# Patient Record
Sex: FEMALE | Race: WHITE | Employment: FULL TIME | URBAN - METROPOLITAN AREA
[De-identification: names, ages, dates, MRNs, and addresses within clinical notes are randomized per-mention and may not be internally consistent; named-entity substitution may affect disease eponyms.]

---

## 2017-01-12 ENCOUNTER — OFFICE VISIT (OUTPATIENT)
Dept: INTERNAL MEDICINE CLINIC | Age: 42
End: 2017-01-12

## 2017-01-12 VITALS
DIASTOLIC BLOOD PRESSURE: 70 MMHG | HEART RATE: 77 BPM | RESPIRATION RATE: 16 BRPM | OXYGEN SATURATION: 99 % | HEIGHT: 63 IN | SYSTOLIC BLOOD PRESSURE: 130 MMHG | TEMPERATURE: 98.1 F | BODY MASS INDEX: 40.75 KG/M2 | WEIGHT: 230 LBS

## 2017-01-12 DIAGNOSIS — E55.9 HYPOVITAMINOSIS D: ICD-10-CM

## 2017-01-12 DIAGNOSIS — G47.30 MILD SLEEP APNEA: ICD-10-CM

## 2017-01-12 DIAGNOSIS — E83.52 SERUM CALCIUM ELEVATED: ICD-10-CM

## 2017-01-12 DIAGNOSIS — R53.83 FATIGUE, UNSPECIFIED TYPE: ICD-10-CM

## 2017-01-12 DIAGNOSIS — F41.1 GAD (GENERALIZED ANXIETY DISORDER): Primary | ICD-10-CM

## 2017-01-12 DIAGNOSIS — E78.00 HYPERCHOLESTEREMIA: ICD-10-CM

## 2017-01-12 DIAGNOSIS — G43.909 MIGRAINE WITHOUT STATUS MIGRAINOSUS, NOT INTRACTABLE, UNSPECIFIED MIGRAINE TYPE: ICD-10-CM

## 2017-01-12 DIAGNOSIS — E66.01 MORBID OBESITY DUE TO EXCESS CALORIES (HCC): ICD-10-CM

## 2017-01-12 RX ORDER — TOPIRAMATE 50 MG/1
50 TABLET, FILM COATED ORAL DAILY
COMMUNITY
End: 2017-03-07

## 2017-01-12 NOTE — PROGRESS NOTES
HPI:  Ale is a 39y.o. year old female who returns to clinic today for routine follow up appointment to discuss the issues below:    Here for follow up of anxiety and sleep. C/o fatigue, wakes up feeling tired after a few hours feels more awake. Takes all her meds at night. Has been on Lexapro for 15 years. Trazodone added 2015. Sleep study in 2011 showed borderline results and cpap didn't help her migraines. Hasn't gained weight since the study. Walks for exercise 30 minutes 3-4 days per week. Diet is higher in calories and sugar than ideal.   Notes in times of stress her weight goes up and she lacks motivation to exercise. Her mood is currently stable . She f/u with a PA at [facility] for her migraines, have been well controlled. Opted to wean off of Topamax. Current dose is 50 mg daily with plan to taper off soon. Reviewed labs - historically has had mild calcium elevation for the past few years. She hasn't taken her MVI for a few weeks. Is adherent currently to her D.  D was 22 last July. She takes iron prn when she feels tired around her cycle. Prior to Admission medications    Medication Sig Start Date End Date Taking? Authorizing Provider   topiramate (TOPAMAX) 50 mg tablet Take 50 mg by mouth daily. Yes Historical Provider   traZODone (DESYREL) 50 mg tablet TAKE 1 TABLET BY MOUTH NIGHTLY. 11/25/16  Yes Sharla Greer MD   escitalopram oxalate (LEXAPRO) 20 mg tablet TAKE 1 TABLET BY MOUTH EVERY DAY 7/17/16  Yes Sharla Greer MD   multivitamin (ONE A DAY) tablet Take 1 Tab by mouth daily. Historical Provider   b complex vitamins tablet Take 1 Tab by mouth daily.     Historical Provider   ferrous sulfate 325 mg (65 mg iron) tablet  3/25/16   Historical Provider   RELPAX 40 mg tablet  4/28/16   Historical Provider          Allergies   Allergen Reactions    Pcn [Penicillins] Rash           Review of Systems   Constitutional: Negative for chills, fever and malaise/fatigue. HENT: Negative for congestion. Respiratory: Negative for cough, shortness of breath and wheezing. Cardiovascular: Negative for chest pain, palpitations and leg swelling. Gastrointestinal: Negative for abdominal pain, blood in stool and heartburn. Musculoskeletal: Negative for falls, joint pain and myalgias. Neurological: Negative for dizziness and headaches. Physical Exam   Constitutional: She appears well-nourished. Obese   Neck: Carotid bruit is not present. No thyroid mass and no thyromegaly present. Cardiovascular: Normal rate, regular rhythm and normal heart sounds. No murmur heard. Pulses:       Carotid pulses are 2+ on the right side, and 2+ on the left side. Pulmonary/Chest: Effort normal and breath sounds normal.   Abdominal: Soft. Bowel sounds are normal. There is no hepatosplenomegaly. There is no tenderness. Musculoskeletal: She exhibits no edema. Visit Vitals    /70 (BP 1 Location: Left arm, BP Patient Position: Sitting)    Pulse 77    Temp 98.1 °F (36.7 °C) (Oral)    Resp 16    Ht 5' 3\" (1.6 m)    Wt 230 lb (104.3 kg)    LMP 01/04/2017 (Exact Date)    SpO2 99%    BMI 40.74 kg/m2         Assessment & Plan:  [facility] was seen today for anxiety and vitamin d deficiency. Diagnoses and all orders for this visit:    Fatigue, unspecified type  Likely multifactorial - obesity, poor diet, mild sleep apnea and medication side effect. Will start by reducing her Trazodone to 1/2 tab at night, if she does well may stop altogether. Encouraged weight reduction and exercise. Check labs/ iron due to h/o iron deficiency. Consider repeat sleep study if no improvement.    -     METABOLIC PANEL, COMPREHENSIVE  -     CBC W/O DIFF  -     FERRITIN    MAGGY (generalized anxiety disorder)  I evaluated and recommended to continue current doses of Lexapro as mood is stable. Plan to taper down Trazodone.     Mild sleep apnea  Historically - may need repeat study. Migraine without status migrainosus, not intractable, unspecified migraine type  Following with neurology - stable. Tapering off Topamax. Morbid obesity due to excess calories ([address]  Spent time discussing caloric intake and portion control. Encouraged high intensity exercise as opposed to few days per week walking. Hypovitaminosis D  -     VITAMIN D, 25 HYDROXY    Serum calcium elevated  Recheck labs this week  -     VITAMIN D, 25 HYDROXY  -     PTH INTACT  -     TSH REFLEX TO T4  -     METABOLIC PANEL, COMPREHENSIVE    Hypercholesteremia  Encouraged weight reduction. Will recheck fasting lipids.    -     METABOLIC PANEL, COMPREHENSIVE  -     LIPID PANEL       she opted not to get the flu vaccine today as she tested positive for flu a few weeks ago        Follow-up Disposition:  Return in about 6 months (around 7/12/2017) for follow up [facility] new provider for anxiety and weight. Advised her to call back or return to office if symptoms worsen/change/persist.  Discussed expected course/resolution/complications of diagnosis in detail with patient. Medication risks/benefits/costs/interactions/alternatives discussed with patient. She was given an after visit summary which includes diagnoses, current medications, & vitals. She expressed understanding with the diagnosis and plan.

## 2017-01-12 NOTE — MR AVS SNAPSHOT
Visit Information Date & Time Provider Department Dept. Phone Encounter #  
 1/12/2017  1:40 PM MD Kevin [facility] [phone] Follow-up Instructions Return in about 6 months (around 7/12/2017) for follow up wi new provider for anxiety and weight. Upcoming Health Maintenance Date Due  
 PAP AKA CERVICAL CYTOLOGY 11/13/2015 DTaP/Tdap/Td series (2 - Td) 4/28/2024 Allergies as of 1/12/2017  Review Complete On: 1/12/2017 By: [id number], MD  
  
 Severity Noted Reaction Type Reactions Pcn [Penicillins]  05/13/2011    Rash Current Immunizations  Reviewed on 1/12/2017 Name Date Influenza Vaccine 12/1/2014, 10/1/2013 Influenza Vaccine Split 11/13/2012 Tdap 4/28/2014 Reviewed by [address], MD on 1/12/2017 at  2:32 PM  
You Were Diagnosed With   
  
 Codes Comments MAGGY (generalized anxiety disorder)    -  Primary ICD-10-CM: F41.1 ICD-9-CM: 300.02 Mild sleep apnea     ICD-10-CM: G47.30 ICD-9-CM: 780.57 Migraine without status migrainosus, not intractable, unspecified migraine type     ICD-10-CM: G43.909 ICD-9-CM: 346.90 Morbid obesity due to excess calories (HCC)     ICD-10-CM: E66.01 
ICD-9-CM: 278.01 Hypovitaminosis D     ICD-10-CM: E55.9 ICD-9-CM: 268.9 Serum calcium elevated     ICD-10-CM: E83.52 
ICD-9-CM: 275.42 Fatigue, unspecified type     ICD-10-CM: R53.83 ICD-9-CM: 780.79 Hypercholesteremia     ICD-10-CM: E78.00 ICD-9-CM: 272.0 Vitals BP Pulse Temp Resp Height(growth percentile) Weight(growth percentile) 130/70 (BP 1 Location: Left arm, BP Patient Position: Sitting) 77 98.1 °F (36.7 °C) (Oral) 16 5' 3\" (1.6 m) 230 lb (104.3 kg) LMP SpO2 BMI OB Status Smoking Status 01/04/2017 (Exact Date) 99% 40.74 kg/m2 Having regular periods Never Smoker Vitals History BMI and BSA Data Body Mass Index Body Surface Area 40.74 kg/m 2 2.15 m 2 Preferred Pharmacy Pharmacy Name Phone [id number] [address] [phone] Your Updated Medication List  
  
   
This list is accurate as of: 1/12/17  2:35 PM.  Always use your most recent med list.  
  
  
  
  
 b complex vitamins tablet Take 1 Tab by mouth daily. escitalopram oxalate 20 mg tablet Commonly known as:  Cliff Yajaira TAKE 1 TABLET BY MOUTH EVERY DAY  
  
 ferrous sulfate 325 mg (65 mg iron) tablet  
  
 multivitamin tablet Commonly known as:  ONE A DAY Take 1 Tab by mouth daily. RELPAX 40 mg tablet Generic drug:  eletriptan  
  
 TOPAMAX 50 mg tablet Generic drug:  topiramate Take 50 mg by mouth daily. traZODone 50 mg tablet Commonly known as:  DESYREL  
TAKE 1 TABLET BY MOUTH NIGHTLY. We Performed the Following CBC W/O DIFF [83109 CPT(R)] FERRITIN [86180 CPT(R)] LIPID PANEL [30754 CPT(R)] METABOLIC PANEL, COMPREHENSIVE [07331 CPT(R)] PTH INTACT [14755 CPT(R)] TSH REFLEX TO T4 [[id number] Custom] VITAMIN D, 25 HYDROXY C356356 CPT(R)] Follow-up Instructions Return in about 6 months (around 7/12/2017) for follow up wiht new provider for anxiety and weight. Introducing [facility]! Dear Jarrett Reyes: 
Thank you for requesting a [employer] account. Our records indicate that you have previously registered for a [employer] account but its currently inactive. Please call our [employer] support line at [phone]. Additional Information If you have questions, please visit the Frequently Asked Questions section of the [employer] website at [email or web address] Remember, [employer] is NOT to be used for urgent needs. For medical emergencies, dial 911. Now available from your iPhone and Android! Please provide this summary of care documentation to your next provider. Your primary care clinician is listed as Bel Forman. If you have any questions after today's visit, please call [phone].

## 2017-01-12 NOTE — PROGRESS NOTES
1. Have you been to the ER, urgent care clinic since your last visit? Hospitalized since your last visit? Yes When: patient [address] 1/6/17 DX positive Flu     2. Have you seen or consulted any other health care providers outside of the [address] [place] [address] since your last visit? Include any pap smears or colon screening.  No  Chief Complaint   Patient presents with    Anxiety    Vitamin D Deficiency

## 2017-01-14 LAB
25(OH)D3+25(OH)D2 SERPL-MCNC: 18 NG/ML (ref 30–100)
ALBUMIN SERPL-MCNC: 4.2 G/DL (ref 3.5–5.5)
ALBUMIN/GLOB SERPL: 1.8 {RATIO} (ref 1.1–2.5)
ALP SERPL-CCNC: 111 IU/L (ref 39–117)
ALT SERPL-CCNC: 23 IU/L (ref 0–32)
AST SERPL-CCNC: 18 IU/L (ref 0–40)
BILIRUB SERPL-MCNC: 0.4 MG/DL (ref 0–1.2)
BUN SERPL-MCNC: 13 MG/DL (ref 6–24)
BUN/CREAT SERPL: 14 (ref 9–23)
CALCIUM SERPL-MCNC: 10.7 MG/DL (ref 8.7–10.2)
CHLORIDE SERPL-SCNC: 104 MMOL/L (ref 96–106)
CHOLEST SERPL-MCNC: 256 MG/DL (ref 100–199)
CO2 SERPL-SCNC: 19 MMOL/L (ref 18–29)
CREAT SERPL-MCNC: 0.96 MG/DL (ref 0.57–1)
ERYTHROCYTE [DISTWIDTH] IN BLOOD BY AUTOMATED COUNT: 14.2 % (ref 12.3–15.4)
FERRITIN SERPL-MCNC: 15 NG/ML (ref 15–150)
GLOBULIN SER CALC-MCNC: 2.3 G/DL (ref 1.5–4.5)
GLUCOSE SERPL-MCNC: 87 MG/DL (ref 65–99)
HCT VFR BLD AUTO: 35.8 % (ref 34–46.6)
HDLC SERPL-MCNC: 52 MG/DL
HGB BLD-MCNC: 11.5 G/DL (ref 11.1–15.9)
INTERPRETATION, 910389: NORMAL
LDLC SERPL CALC-MCNC: 174 MG/DL (ref 0–99)
MCH RBC QN AUTO: 25.9 PG (ref 26.6–33)
MCHC RBC AUTO-ENTMCNC: 32.1 G/DL (ref 31.5–35.7)
MCV RBC AUTO: 81 FL (ref 79–97)
PLATELET # BLD AUTO: 300 X10E3/UL (ref 150–379)
POTASSIUM SERPL-SCNC: 4.7 MMOL/L (ref 3.5–5.2)
PROT SERPL-MCNC: 6.5 G/DL (ref 6–8.5)
PTH-INTACT SERPL-MCNC: 82 PG/ML (ref 15–65)
RBC # BLD AUTO: 4.44 X10E6/UL (ref 3.77–5.28)
SODIUM SERPL-SCNC: 139 MMOL/L (ref 134–144)
TRIGL SERPL-MCNC: 151 MG/DL (ref 0–149)
TSH SERPL DL<=0.005 MIU/L-ACNC: 1.84 UIU/ML (ref 0.45–4.5)
VLDLC SERPL CALC-MCNC: 30 MG/DL (ref 5–40)
WBC # BLD AUTO: 6.7 X10E3/UL (ref 3.4–10.8)

## 2017-01-16 ENCOUNTER — TELEPHONE (OUTPATIENT)
Dept: INTERNAL MEDICINE CLINIC | Age: 42
End: 2017-01-16

## 2017-01-16 DIAGNOSIS — E55.9 VITAMIN D DEFICIENCY: Primary | ICD-10-CM

## 2017-01-16 NOTE — TELEPHONE ENCOUNTER
Patient called stating that she was returning a call from [facility] about her lab results. Please call her back at [phone]

## 2017-01-17 RX ORDER — ERGOCALCIFEROL 1.25 MG/1
50000 CAPSULE ORAL
Qty: 12 CAP | Refills: 0 | Status: SHIPPED | OUTPATIENT
Start: 2017-01-17 | End: 2018-01-24

## 2017-01-17 NOTE — TELEPHONE ENCOUNTER
Spoke to pt - labs c/w hyperparathyroidism, but she is also D deficient.    Her cholesterol is also elevated    Plan:  Start prescription strength D weekly for 12 weeks  Schedule to see endocrine - several providers names given  Call me with name of endocrinologist she will see so that records may be faxed  Lifestyle changes to work on cholesterol

## 2017-01-18 NOTE — TELEPHONE ENCOUNTER
Last office visit - 01.12.17  Next office visit -   Last Refill - 07.17.16     Requested Prescriptions     Pending Prescriptions Disp Refills    escitalopram oxalate (LEXAPRO) 20 mg tablet 30 Tab 5

## 2017-01-18 NOTE — TELEPHONE ENCOUNTER
Patient has been travelling for work and has left her lexapro in a hotel in another city. She wanted to know if a script could be sent to her local pharmacy. PAtient's number is [phone]

## 2017-01-19 RX ORDER — ESCITALOPRAM OXALATE 20 MG/1
TABLET ORAL
Qty: 30 TAB | Refills: 11 | Status: SHIPPED | OUTPATIENT
Start: 2017-01-19 | End: 2017-02-01 | Stop reason: SDUPTHER

## 2017-01-19 NOTE — TELEPHONE ENCOUNTER
Pt is calling to check the status on refill and wanted to inform Dr. Alecia Ibarra that she has scheduled an appointment with an Endocrinologist Dr. Sharif Moscoso and Griselda Lanier for 3/7/17

## 2017-01-27 ENCOUNTER — TELEPHONE (OUTPATIENT)
Dept: INTERNAL MEDICINE CLINIC | Age: 42
End: 2017-01-27

## 2017-01-27 DIAGNOSIS — M54.2 NECK PAIN: Primary | ICD-10-CM

## 2017-01-27 NOTE — TELEPHONE ENCOUNTER
Patient [facility]  She doesn't require an insurance referral. Please route back to me once order is put in so that I may call patient to

## 2017-01-27 NOTE — TELEPHONE ENCOUNTER
Pt call today stating that her massage therapist told her that she need physical therapy for her migraine you can contact pt at [phone]

## 2017-01-29 RX ORDER — TRAZODONE HYDROCHLORIDE 50 MG/1
TABLET ORAL
Qty: 30 TAB | Refills: 0 | Status: SHIPPED | OUTPATIENT
Start: 2017-01-29 | End: 2018-01-24

## 2017-01-31 NOTE — PROGRESS NOTES
See telephone note:    Spoke to pt - labs c/w hyperparathyroidism, but she is also D deficient.    Her cholesterol is also elevated     Plan:  Start prescription strength D weekly for 12 weeks  Schedule to see endocrine - several providers names given  Call me with name of endocrinologist she will see so that records may be faxed  Lifestyle changes to work on cholesterol

## 2017-02-01 NOTE — TELEPHONE ENCOUNTER
I don't typically refer migraine patients to PT, so I called pt to find out further details. Left VM for her to return call with further details.

## 2017-02-02 NOTE — TELEPHONE ENCOUNTER
Pt states that she ahs neck pain and her migraines seem to be related to neck pain. Pt states that her masseuse states that she has migraines related to neck pain and that she had PT to strength her neck muscles and pt states that she would like to try it.

## 2017-02-03 NOTE — TELEPHONE ENCOUNTER
Attempted to contact patient without success.  Left voicemail informing patient that referral requested is ready for  and our office hours

## 2017-02-14 ENCOUNTER — HOSPITAL ENCOUNTER (OUTPATIENT)
Dept: PHYSICAL THERAPY | Age: 42
Discharge: HOME | End: 2017-02-14
Payer: COMMERCIAL

## 2017-02-14 PROCEDURE — 97161 PT EVAL LOW COMPLEX 20 MIN: CPT | Performed by: PHYSICAL THERAPIST

## 2017-02-14 PROCEDURE — 97530 THERAPEUTIC ACTIVITIES: CPT | Performed by: PHYSICAL THERAPIST

## 2017-02-14 PROCEDURE — 97110 THERAPEUTIC EXERCISES: CPT | Performed by: PHYSICAL THERAPIST

## 2017-02-14 NOTE — PROGRESS NOTES
[facility]  [address] [facility], [address]  Phone: [phone]  Fax: [phone]    PT INITIAL EVALUATION NOTE - [facility] 2-15    Patient Name: Jarred Abrams  Date:2017  : 1975  [x]  Patient  Verified  Payor: Carolyn Jose L / Plan: Treinta Y Fidel [id number] PPO / Product Type: PPO /    In time:835  Out time:940  Total Treatment Time (min): 55  Total Timed Codes (min): 25  1:1 Treatment Time ( only): n/a   Visit #: 1     Treatment Area: Cervicalgia [M54.2]    Subjective: Any medication changes, allergies to medications, adverse drug reactions, diagnosis change, or new procedure performed?: [] No    [x] Yes (see summary    Date of onset/injury: 10 years ago regarding migraines/neck pain and maybe even longer for neck pain. Pt notes treatment for migraines. Treatment for migraines include a neurologist, topomax (didn't really work), relpax, lifestyle changes - walking 30 ' a day, consistent bed/wake times and meals. Frequency now:  7-8x/month (pt noting using full prescription of relpax each month). She misses some work time due to migraines. Neck pain is posterior cervical muscles, right side mostly \"95% of the time. \"     Migraines tend to be on the right side, more in afternoon and evening - typically at work but could be from working on project at home, busy in summer outside, sometimes wakes up from them, right posterior neck, radiate to head and to right forehead. Sleep - she does not use a pillow - hurts neck too much, sleeps on her back. Pain:   6/10 max 0/10 min 0 \"stiffness\"/10 now       Aggravated by: ignoring it - still working at the computer, busy working in the yard, bending down to  something heavy. Stress at work. Eased by: stretch, heat, rest, aleave, relpax. Location and description of symptoms: \"feels like a weak spot\" \"stiffness\"      Diagnostic Tests: None.       Social/Recreation/Work: Sitting at her computer a lot, working at [employer], sitting and writing. Pt sits for 9 hours/day. Pt sits for 4 hour stretches at  A time. Prior level of function: Pt notes worse in past 3-4 months \"I don't really know why\"  Pt notes did have flu in December, may have been sleeping funny. Prior to 3-4 months ago had 1-2 migraines a month. Patient goal(s): \"I want to strength neck with goal of reducing number and severity of migraines\"  \"1-2x/month migraines\"      PMH: Significant for migraines, mild depression    Headaches: Do you have headaches? [x] Yes   [] No  Migraines - usually on the right side of neck, radiate up head and to right forehead    Objective:      Observation/posture: Sitting at computer, head is turned to the left, computer is set up in the corner, L-shaped desk, typing from a document, flat on the table. Pt has adjustable chair, monitor is adjustable and has monitor straight ahead, ergonomic keyboard. Active Movements:   AROM Degrees Comments:pain, area   Forward flexion 48 deg Stretch on the right, feels good  *although, pt notes stretching sometimes makes pain worse   Extension 49 deg No change   Rotation right 50 deg Stretch left   Rotation left 63 deg Tight right   SB right 24 deg Pressure right, stretch left   SB left 20 deg Stretch right         Strength: cervical extension: 5/5, no change in pain. Neurosensory:  Sensory examination reveals NT, no c/o of numbness/tingling/burning. Palpation: hypertonicity and increased pain right upper trap., R SCM at mastoid process, right scalenes, posterior cervical muscles at C6 level \"knot\", right sub occipitals, pt notes pain up back of her head with palpation to S.O. Muscles. Special Tests:  Cervical:          Distraction:  Feels relief/stretching. Outcome Measure: Patient presents with a FOTO Score of  In chart.       10 min Therapeutic Exercise:  [] See flow sheet : CCF in seated position, scapular retractions in seated position. Supine CCF, supine sub occipital stretch (CCF position with rolled towel). Rationale: increase strength and improve coordination to improve the patients ability to work at computer    15 min Therapeutic Activity:  []  See flow sheet :  Long discussion of computer work station posture : use of rolled towel to show lumbar support, pt ed. To adjust arm rests to support her arm, use of document lugo placed directly next to monitor and vary which side it is on to minimize accumulated stress/strain on same tissues, avoid sitting > 30-60 ' at a time, avoid [facility] - move monitor closer if needed. Rationale: improve coordination  to improve the patients ability to work at computer. With   [] TE   [] TA   [] neuro   [] other: Patient Education: [x] Review HEP    [] Progressed/Changed HEP based on:   [] positioning   [] body mechanics   [] transfers   [] heat/ice application    [] other:        Pain Level (0-10 scale) post treatment: no c/o of pain.     Assessment:  [x] See POC  [] Other:    Plan:   [x] See POC    Kelly Garnett PT DPT, OCS 2/14/2017  8:33 AM

## 2017-02-14 NOTE — PROGRESS NOTES
[facility]  [address], [place], [address] [facility]  Phone: [phone]      Fax:  [phone] of Care/ Statement of Necessity for Physical Therapy [address]  2/14/2017   Odalis Kruger MD   Provider #                        Diagnosis: Cervicalgia [M54.2]  Onset Date:See eval.     Start of Care:2/14/2017  Prior Level of Function:See eval.  Comorbidities: See eval.    The Plan of Care and following information is based on the information from the initial evaluation. Assessment/ key information: Pt is a 40 yo female with chronic neck pain as well as migraines. Pt correlates her mostly right sided neck pain with migraines/head pain. Pt demonstrates very poor work station set up and sits for 4 hours at a time which is likely increasing neck pain and potentially triggering migraines. Pt with increased TTP right SCM, scalenes, upper trap., sub occipitals and posterior cervical musculature at C6 level. Pt notes she is frequently turning her head to the left to look at document on her desk while typing on computer. Pt is an excellent candidate for PT to address above deficits. Treatment Plan may include any combination of the following:  Therapeutic exercise, Therapeutic activities, Neuromuscular re-education, Physical agent/modality, Manual therapy and Patient education  Patient / Family readiness to learn indicated by: asking questions, trying to perform skills and interest  Persons(s) to be included in education:   patient (P)  Barriers to Learning/Limitations: None    Patient Goal (s): Located in evaluation. Rehabilitation Potential: good    Short Term Goals: To be accomplished in 2 weeks  1) Pt will report she is sitting 60 ' maximum at work versus 4 hours for decreased sustained strain to cervical musculature.   2) Pt will report she is using document lugo next to monitor and varying left to right to decrease sustained strain to cervical musculature. 3) Pt will report she has improved work station set up including use of lumbar support and moving monitor closer to her to reduce FHP to decrease sustained strain to cervical musculature    Long Term Goals: To be accomplished in 4-6 weeks   1) Pt indep. In final HEP  2) Pt will report decreased frequency of migraines from 7-8 x / month to baseline of 1-2x/month  3) Pt will not need to use full prescription of relpax each month per her report due to decreased severity/frequency of migraines  4) Pt will report she avoids sitting for > 30 ' at a time at computer work station  5) Pt will report at least 50% improvement in right sided neck pain since starting PT    Frequency / Duration: Patient to be seen 1-2 times per week for 4-6 weeks:  Patient/ Caregiver education and instruction: self care and exercises    Carie Davila, PT DPT, OCS 2/14/2017 8:33 AM    Please direct questions or concerns to [place] [address] and Sports Medicine: Phone: [phone] Fax: [phone]. THANK YOU!

## 2017-02-16 ENCOUNTER — HOSPITAL ENCOUNTER (OUTPATIENT)
Dept: PHYSICAL THERAPY | Age: 42
Discharge: HOME | End: 2017-02-16
Payer: COMMERCIAL

## 2017-02-16 PROCEDURE — 97110 THERAPEUTIC EXERCISES: CPT | Performed by: PHYSICAL THERAPY ASSISTANT

## 2017-02-16 PROCEDURE — 97140 MANUAL THERAPY 1/> REGIONS: CPT | Performed by: PHYSICAL THERAPY ASSISTANT

## 2017-02-16 NOTE — PROGRESS NOTES
PT DAILY TREATMENT NOTE 2-15    Patient Name: Jarred Abrams  Date:2017  : 1975  [x]  Patient  Verified  Payor: Carolyn Domingo / Plan: Angela Parra [id number] PPO / Product Type: PPO /    In time:8:35 AM  Out time: 9:25 AM  Total Treatment Time (min): 50  Visit #: 2     Treatment Area: Cervicalgia [M54.2]    SUBJECTIVE  Pain Level (0-10 scale): 0 \"Tightness on the Right\" States she has modified her workstation per therapist recommendations \"That has really helped. \"     Any medication changes, allergies to medications, adverse drug reactions, diagnosis change, or new procedure performed?: [x] No    [] Yes (see summary sheet for update)  Subjective functional status/changes:   [] No changes reported  See pedro    OBJECTIVE    Modality rationale: decrease pain and increase tissue extensibility to improve the patients ability to working on the computer, working in the yard, bending down to  something heavy   Min Type Additional Details    [] Estim: []Att   []Unatt        []TENS instruct                  []IFC  []Premod   []NMES                     []Other:  []w/US   []w/ice   []w/heat  Position:  Location:    []  Traction: [] Cervical       []Lumbar                       [] Prone          []Supine                       []Intermittent   []Continuous Lbs:  [] before manual  [] after manual  []w/heat    []  Ultrasound: []Continuous   [] Pulsed at:                            []1MHz   []3MHz Location:  W/cm2:    []  Paraffin         Location:  []w/heat   10 []  Ice     [x]  Heat prior to manual therapy  []  Ice massage Position:supine with LE's elevated  Location:    []  Laser  []  Other: Position:  Location:    []  Vasopneumatic Device Pressure:       [] lo [] med [] hi   Temperature:    [x] Skin assessment post-treatment:  [x]intact []redness- no adverse reaction    []redness  adverse reaction:     25 min Therapeutic Exercise:  [x] See flow sheet : added foam roll on wall \"w\", Tband shoulder row and extension with red band, SCM stretch   Rationale: increase ROM, increase strength and improve coordination to improve the patients ability to working on the computer, working in the yard, bending down to  something heavy    15 min Manual Therapy:  STM R UT, Jason Hall. Scapula, SCM, manual UT and Lev. Scap stretches   Rationale: decrease pain, increase ROM, increase tissue extensibility and decrease trigger points  to improve the patients ability to working on the computer, working in the yard, bending down to  something heavy            With   [x] TE   [] TA   [] neuro   [] other: Patient Education: [x] Review HEP    [x] Progressed/Changed HEP based on: TB shoulder row and extension, gave red band, SCM stretch  [x] positioning   [x] body mechanics   [] transfers   [] heat/ice application    [x] other: reviewed postural principles and ergonomic work station set up. Other Objective/Functional Measures: nt     Pain Level (0-10 scale) post treatment: 0/10 \"Not as tight. \"    ASSESSMENT/Changes in Function:   Patient is compliant with HEP and modified work station to improve ergonomics. Patient without reports of pain during new scapular strengthening exercises, minor cues to avoid UT over utilization with TB shoulder rows. Overall tolerated session well, gave tband and printout for updated HEP. .   Patient will continue to benefit from skilled PT services to modify and progress therapeutic interventions, address functional mobility deficits, address ROM deficits, address strength deficits, analyze and address soft tissue restrictions, analyze and modify body mechanics/ergonomics and instruct in home and community integration to attain remaining goals.      []  See Plan of Care  []  See progress note/recertification  []  See Discharge Summary         Progress towards goals / Updated goals:  nt    PLAN  []  Upgrade activities as tolerated     [x]  Continue plan of care  []  Update interventions per flow sheet       []  Discharge due to:_  []  Other:_      Maria Luisa Daly, SHRUTHI 2/16/2017  8:35 AM

## 2017-02-23 ENCOUNTER — HOSPITAL ENCOUNTER (OUTPATIENT)
Dept: PHYSICAL THERAPY | Age: 42
Discharge: HOME | End: 2017-02-23
Payer: COMMERCIAL

## 2017-02-23 PROCEDURE — 97140 MANUAL THERAPY 1/> REGIONS: CPT | Performed by: PHYSICAL THERAPIST

## 2017-02-23 PROCEDURE — 97110 THERAPEUTIC EXERCISES: CPT | Performed by: PHYSICAL THERAPIST

## 2017-02-23 NOTE — PROGRESS NOTES
PT DAILY TREATMENT NOTE 2-15    Patient Name: Oswaldo King  Date:2017  : 1975  [x]  Patient  Verified  Payor: BLUE CROSS / Plan: Angela Parra [id number] PPO / Product Type: PPO /    In time:300PM  Out time: 400  Total Treatment Time (min): 60  Timed codes: 50  Visit #: 3    Treatment Area: Cervicalgia [M54.2]    SUBJECTIVE  Pain Level (0-10 scale): 2/10, pt notes that she did have a migraine this morning, took medication but this was the first time she had migraine since starting PT     Any medication changes, allergies to medications, adverse drug reactions, diagnosis change, or new procedure performed?: [x] No    [] Yes (see summary sheet for update)  Subjective functional status/changes:   [] No changes reported  See pedro    OBJECTIVE    Modality rationale: decrease pain and increase tissue extensibility to improve the patients ability to working on the computer, working in the yard, bending down to  something heavy   Min Type Additional Details    [] Estim: []Att   []Unatt        []TENS instruct                  []IFC  []Premod   []NMES                     []Other:  []w/US   []w/ice   []w/heat  Position:  Location:    []  Traction: [] Cervical       []Lumbar                       [] Prone          []Supine                       []Intermittent   []Continuous Lbs:  [] before manual  [] after manual  []w/heat    []  Ultrasound: []Continuous   [] Pulsed at:                            []1MHz   []3MHz Location:  W/cm2:    []  Paraffin         Location:  []w/heat   10 []  Ice     [x]  Heat prior to manual therapy  []  Ice massage Position:supine with LE's elevated  Location:    []  Laser  []  Other: Position:  Location:    []  Vasopneumatic Device Pressure:       [] lo [] med [] hi   Temperature:    [x] Skin assessment post-treatment:  [x]intact []redness- no adverse reaction    []redness  adverse reaction:     35 min Therapeutic Exercise:  [x] See flow sheet : added prone over swiss ball CCF and lower cervical extension with arm lifts - increased time to cues posture. Also, seated on swiss ball with neutral spine, engaging abdominals and mini march. Corrected SCM stretch, SO stretch. Review of posture at work station, encouraged pt to return to [employer] where she is a member and start cardiovascular exercise 1x/week to counteract sitting, for stress relief as well as avoid sitting for > 30-60 ' at a time. Rationale: increase ROM, increase strength and improve coordination to improve the patients ability to working on the computer, working in the yard, bending down to  something heavy    15 min Manual Therapy:  STM R scalenes, R SO muscles / release, R SCM. Rationale: decrease pain, increase ROM, increase tissue extensibility and decrease trigger points  to improve the patients ability to working on the computer, working in the yard, bending down to  something heavy            With   [x] TE   [] TA   [] neuro   [] other: Patient Education: [x] Review HEP    [x] Progressed/Changed HEP based on: seated on swiss ball march and prone over swiss ball  [x] positioning   [x] body mechanics   [] transfers   [] heat/ice application    [x] other: reviewed postural principles and ergonomic work station set up. Other Objective/Functional Measures: nt     Pain Level (0-10 scale) post treatment: \"0.5\"    ASSESSMENT/Changes in Function:   Pt shows improvement, notes today was first migraine since starting PT 02/14. Also, pt is doing well with modifications at work station but still sitting 2 hours at a time. Encouraged pt to limit sitting to 30-60 minutes as well as increasing regular cardiovascular exercise to counteract sustained sitting postures. Pt noting she does have a gym membership but does not go at this time. Pt has met 2/3 STG's at this time.      Patient will continue to benefit from skilled PT services to modify and progress therapeutic interventions, address functional mobility deficits, address ROM deficits, address strength deficits, analyze and address soft tissue restrictions, analyze and modify body mechanics/ergonomics and instruct in home and community integration to attain remaining goals. []  See Plan of Care  []  See progress note/recertification  []  See Discharge Summary         Short Term Goals: To be accomplished in 2 weeks  1) Pt will report she is sitting 60 ' maximum at work versus 4 hours for decreased sustained strain to cervical musculature. Progressing - pt is sitting 2 hr at a time. 2) Pt will report she is using document lugo next to monitor and varying left to right to decrease sustained strain to cervical musculature. MET  3) Pt will report she has improved work station set up including use of lumbar support and moving monitor closer to her to reduce FHP to decrease sustained strain to cervical musculature MET      Long Term Goals: To be accomplished in 4-6 weeks   1) Pt indep.  In final HEP  2) Pt will report decreased frequency of migraines from 7-8 x / month to baseline of 1-2x/month  3) Pt will not need to use full prescription of relpax each month per her report due to decreased severity/frequency of migraines  4) Pt will report she avoids sitting for > 30 ' at a time at computer work station  5) Pt will report at least 50% improvement in right sided neck pain since starting PT    PLAN  []  Upgrade activities as tolerated     [x]  Continue plan of care  []  Update interventions per flow sheet       []  Discharge due to:_  [x]  Other: review updated HEP (wrote down on paper as printer was not working)_      Karen Ferrari, PT 2/23/2017  8:35 AM

## 2017-02-28 ENCOUNTER — APPOINTMENT (OUTPATIENT)
Dept: PHYSICAL THERAPY | Age: 42
End: 2017-02-28
Payer: COMMERCIAL

## 2017-03-03 ENCOUNTER — HOSPITAL ENCOUNTER (OUTPATIENT)
Dept: PHYSICAL THERAPY | Age: 42
Discharge: HOME | End: 2017-03-03
Payer: COMMERCIAL

## 2017-03-03 PROCEDURE — 97140 MANUAL THERAPY 1/> REGIONS: CPT | Performed by: PHYSICAL THERAPY ASSISTANT

## 2017-03-03 PROCEDURE — 97110 THERAPEUTIC EXERCISES: CPT | Performed by: PHYSICAL THERAPY ASSISTANT

## 2017-03-03 NOTE — PROGRESS NOTES
PT DAILY TREATMENT NOTE 2-15    Patient Name: Anjali Handley  Date:3/3/2017  : 1975  [x]  Patient  Verified  Payor: BLUE CROSS / Plan: Angela Parra [id number] PPO / Product Type: PPO /    In time:9:05 AM  Out time:9:55 AM  Total Treatment Time (min): 50  Timed codes: 40  Visit #: 4    Treatment Area: Cervicalgia [M54.2]    SUBJECTIVE  Pain Level (0-10 scale): 2/10, \"I had a couple migraines due to the weather fronts but I don't think there is anything I could do with that, I also had some neck pain associated with that. \" States a little neck pain today \"i think I slept funny on my side but it's not too bad. \"    Any medication changes, allergies to medications, adverse drug reactions, diagnosis change, or new procedure performed?: [x] No    [] Yes (see summary sheet for update)  Subjective functional status/changes:   [] No changes reported  See pedro    OBJECTIVE    Modality rationale: decrease pain and increase tissue extensibility to improve the patients ability to working on the computer, working in the yard, bending down to  something heavy   Min Type Additional Details    [] Estim: []Att   []Unatt        []TENS instruct                  []IFC  []Premod   []NMES                     []Other:  []w/US   []w/ice   []w/heat  Position:  Location:    []  Traction: [] Cervical       []Lumbar                       [] Prone          []Supine                       []Intermittent   []Continuous Lbs:  [] before manual  [] after manual  []w/heat    []  Ultrasound: []Continuous   [] Pulsed at:                            []1MHz   []3MHz Location:  W/cm2:    []  Paraffin         Location:  []w/heat   10 []  Ice     [x]  Heat prior to manual therapy  []  Ice massage Position:supine with LE's elevated  Location:    []  Laser  []  Other: Position:  Location:    []  Vasopneumatic Device Pressure:       [] lo [] med [] hi   Temperature:    [x] Skin assessment post-treatment:  [x]intact []redness- no adverse reaction    []redness  adverse reaction:     25 min Therapeutic Exercise:  [x] See flow sheet :     Rationale: increase ROM, increase strength and improve coordination to improve the patients ability to working on the computer, working in the yard, bending down to  something heavy    15 min Manual Therapy:  STM R scalenes, R SO muscles / release, R SCM. Rationale: decrease pain, increase ROM, increase tissue extensibility and decrease trigger points  to improve the patients ability to working on the computer, working in the yard, bending down to  something heavy            With   [x] TE   [] TA   [] neuro   [] other: Patient Education: [x] Review HEP    [x] Progressed/Changed HEP based on: seated on swiss ball march and prone over swiss ball  [x] positioning   [x] body mechanics   [] transfers   [] heat/ice application    [x] other:       Other Objective/Functional Measures: nt     Pain Level (0-10 scale) post treatment: \"0.5\" \"Much looser. \"    ASSESSMENT/Changes in Function:   Decreased cues for technique during SCM stretch. Challenged to maintain chin tuck during prone over therabald alt. UE flexion. Overall improved postural awareness since start of PT. Patient will continue to benefit from skilled PT services to modify and progress therapeutic interventions, address functional mobility deficits, address ROM deficits, address strength deficits, analyze and address soft tissue restrictions, analyze and modify body mechanics/ergonomics and instruct in home and community integration to attain remaining goals. []  See Plan of Care  []  See progress note/recertification  []  See Discharge Summary         Short Term Goals: To be accomplished in 2 weeks  1) Pt will report she is sitting 60 ' maximum at work versus 4 hours for decreased sustained strain to cervical musculature. Progressing - pt is sitting 2 hr at a time.   2) Pt will report she is using document lugo next to monitor and varying left to right to decrease sustained strain to cervical musculature. MET  3) Pt will report she has improved work station set up including use of lumbar support and moving monitor closer to her to reduce FHP to decrease sustained strain to cervical musculature MET      Long Term Goals: To be accomplished in 4-6 weeks   1) Pt indep.  In final HEP  2) Pt will report decreased frequency of migraines from 7-8 x / month to baseline of 1-2x/month  3) Pt will not need to use full prescription of relpax each month per her report due to decreased severity/frequency of migraines  4) Pt will report she avoids sitting for > 30 ' at a time at computer work station  5) Pt will report at least 50% improvement in right sided neck pain since starting PT     PLAN  []  Upgrade activities as tolerated     [x]  Continue plan of care  []  Update interventions per flow sheet       []  Discharge due to:_  [x]  Other:    Annabella Hernandez, PTA 3/3/2017  9:05 AM

## 2017-03-07 ENCOUNTER — HOSPITAL ENCOUNTER (OUTPATIENT)
Dept: PHYSICAL THERAPY | Age: 42
Discharge: HOME | End: 2017-03-07
Payer: COMMERCIAL

## 2017-03-07 ENCOUNTER — OFFICE VISIT (OUTPATIENT)
Dept: ENDOCRINOLOGY | Age: 42
End: 2017-03-07

## 2017-03-07 VITALS
WEIGHT: 232 LBS | BODY MASS INDEX: 41.11 KG/M2 | HEART RATE: 80 BPM | SYSTOLIC BLOOD PRESSURE: 120 MMHG | DIASTOLIC BLOOD PRESSURE: 88 MMHG | HEIGHT: 63 IN | RESPIRATION RATE: 16 BRPM | OXYGEN SATURATION: 99 %

## 2017-03-07 DIAGNOSIS — E83.52 HYPERCALCEMIA: Primary | ICD-10-CM

## 2017-03-07 DIAGNOSIS — E55.9 HYPOVITAMINOSIS D: ICD-10-CM

## 2017-03-07 PROCEDURE — 97140 MANUAL THERAPY 1/> REGIONS: CPT | Performed by: PHYSICAL THERAPY ASSISTANT

## 2017-03-07 PROCEDURE — 97110 THERAPEUTIC EXERCISES: CPT | Performed by: PHYSICAL THERAPY ASSISTANT

## 2017-03-07 RX ORDER — CODEINE PHOSPHATE AND GUAIFENESIN 10; 100 MG/5ML; MG/5ML
SOLUTION ORAL
COMMUNITY
Start: 2017-01-06 | End: 2017-03-07 | Stop reason: ALTCHOICE

## 2017-03-07 RX ORDER — TOPIRAMATE 25 MG/1
TABLET ORAL
Refills: 0 | COMMUNITY
Start: 2017-01-12 | End: 2017-03-07

## 2017-03-07 RX ORDER — AZITHROMYCIN 250 MG/1
TABLET, FILM COATED ORAL
COMMUNITY
Start: 2017-01-06 | End: 2017-03-07 | Stop reason: ALTCHOICE

## 2017-03-07 RX ORDER — OSELTAMIVIR PHOSPHATE 75 MG
CAPSULE ORAL
COMMUNITY
Start: 2017-01-06 | End: 2017-03-07 | Stop reason: ALTCHOICE

## 2017-03-07 NOTE — PROGRESS NOTES
Endocrinology New Patient Visit    Chief Complaint: hypercalcemia    Referring provider: Antione Myrick MD    History of Present Illness:  Lubna Porter is a 39 y.o. female with h/o migraines, morbid obesity, vitamin D and iron deficiency who was referred for hypercalcemia and concern for hyperparathyroidism. Review of her records indicate she has had elevated serum calcium levels dating back to 2014. Highest was 11.2, most recent calcium in January was 10.7 with a PTH of 82. Vitamin D was low at 18 so ergocalciferol was started in January. She has taken 5 pills total, last one was yesterday. She denies any overt symptoms from the high calcium levels. She has no history of nephrolithiasis. Denies frequent abdominal pain but occasionally has loose stools (usually after eating dinner). Reports some fatigue but attributes this to her excess weight and stressful work schedule. Has never taken thiazide diuretics. She is adopted so does not know if she has a family history of calcium disorders. No history of fractures. Has never had a DXA scan. Does not consume excess amounts of dairy products, usually 0-1 serving per day.     Review of Systems:   General ROS: positive for  - fatigue  Ophthalmic ROS: negative  ENT ROS: negative  Endocrine ROS: negative  Respiratory ROS: no cough, shortness of breath, or wheezing  Cardiovascular ROS: no chest pain or dyspnea on exertion  Gastrointestinal ROS: positive for - change in stools  Genito-Urinary ROS: no dysuria, trouble voiding, or hematuria  Musculoskeletal ROS: negative  Neurological ROS: positive for - headaches  Dermatological ROS: negative    Problem List:  Patient Active Problem List   Diagnosis Code    Migraines G43.909    Morbid obesity due to excess calories ([facility]) E66.01    Mild sleep apnea G47.30    Hypovitaminosis D E55.9    MAGGY (generalized anxiety disorder) F41.1    Infected lesion in nose J34.89    Serum calcium elevated E83.52    Iron deficiency anemia due to chronic blood loss D50.0    Vitamin D insufficiency E55.9    Abnormal findings on esophagogastroduodenoscopy (EGD) R19.8       Past Medical History:    Past Medical History:   Diagnosis Date    Anxiety     Apnea     mild    Depression     Migraines        Past Surgical History:  History reviewed. No pertinent surgical history. Social History:  Social History     Social History    Marital status: SINGLE     Spouse name: N/A    Number of children: N/A    Years of education: N/A     Occupational History    Not on file. Social History Main Topics    Smoking status: Never Smoker    Smokeless tobacco: Never Used    Alcohol use 0.6 oz/week     0 Standard drinks or equivalent, 1 Glasses of wine per week      Comment: rare    Drug use: No    Sexual activity: No      Comment: single has no children     Other Topics Concern    Not on file     Social History Narrative       Family History:  Family History   Problem Relation Age of Onset    Adopted: Yes    No Known Problems Mother     No Known Problems Father        Medications:     Current Outpatient Prescriptions:     escitalopram oxalate (LEXAPRO) 20 mg tablet, TAKE 1 TABLET EVERY DAY, Disp: 30 Tab, Rfl: 5    traZODone (DESYREL) 50 mg tablet, TAKE 1 TABLET BY MOUTH NIGHTLY., Disp: 30 Tab, Rfl: 0    ergocalciferol (ERGOCALCIFEROL) 50,000 unit capsule, Take 1 Cap by mouth every seven (7) days. , Disp: 12 Cap, Rfl: 0    multivitamin (ONE A DAY) tablet, Take 1 Tab by mouth daily. , Disp: , Rfl:     b complex vitamins tablet, Take 1 Tab by mouth daily. , Disp: , Rfl:     ferrous sulfate 325 mg (65 mg iron) tablet, , Disp: , Rfl: 3    RELPAX 40 mg tablet, , Disp: , Rfl: 11    Allergies:   Allergies   Allergen Reactions    Pcn [Penicillins] Rash       Physical Examination:  Visit Vitals    /88    Pulse 80    Resp 16    Ht 5' 3\" (1.6 m)    Wt 232 lb (105.2 kg)    LMP 02/24/2017    SpO2 99%    BMI 41.1 kg/m2       Gen: no acute distress  HEENT: mucous membranes moist, normocephalic, non traumatic  Thyroid: no enlargement or nodules noted  CAD: normal rate, regular rhythm. No murmur rubs or gallops  PULM: clear to ausculation, no wheezes, rhonchis or rales. EXT: no clubbing, cyanosis or edema  Neuro: grossly non focal, normal DTRs  Skin: warm, dry  Psych: pleasant, good insight into medical hx    Clinical Data Review:    Lab Results   Component Value Date/Time    Sodium 139 01/13/2017 11:44 AM    Potassium 4.7 01/13/2017 11:44 AM    Chloride 104 01/13/2017 11:44 AM    CO2 19 01/13/2017 11:44 AM    Glucose 87 01/13/2017 11:44 AM    BUN 13 01/13/2017 11:44 AM    Creatinine 0.96 01/13/2017 11:44 AM    BUN/Creatinine ratio 14 01/13/2017 11:44 AM    GFR est AA 85 01/13/2017 11:44 AM    GFR est non-AA 74 01/13/2017 11:44 AM    Calcium 10.7 01/13/2017 11:44 AM    Bilirubin, total 0.4 01/13/2017 11:44 AM    AST (SGOT) 18 01/13/2017 11:44 AM    Alk. phosphatase 111 01/13/2017 11:44 AM    Protein, total 6.5 01/13/2017 11:44 AM    Albumin 4.2 01/13/2017 11:44 AM    A-G Ratio 1.8 01/13/2017 11:44 AM    ALT (SGPT) 23 01/13/2017 11:44 AM     Lab Results   Component Value Date/Time    Calcium 10.7 01/13/2017 11:44 AM    PTH, Intact 82 01/13/2017 11:44 AM     Lab Results   Component Value Date/Time    VITAMIN D, 25-HYDROXY 18.0 01/13/2017 11:44 AM         Assessment and Plan:  Patient is a 39 y.o. female here for hypercalcemia, likely due to primary hyperparathyroidism. Before making this diagnosis, I need to exclude [address] so will have her do a 24h urine collection for calcium. Vitamin D deficiency can cause secondary hyperparathyroidism but does not typically elevated calcium to this degree. I advised her not to take any more ergocalciferol as this could potentially make the hypercalcemia worse. Recommended she start low dose cholecalciferol (400-800 IU daily) instead. Repeat vit D, PTH, and calcium levels today.  Should blood and urine tests today return consistent with primary hyperparathyroidism, will order Sestamibi and US to see if we can localize a candidate PTH gland for parathyroidectomy. Will obtain DXA (with forearm) at that time as well. Patient verbalized an understanding and will return to clinic in 3 months. I spent 30 minutes with the patient today and > 50% of the time was spent counseling the patient about hypercalcemia: its potential causes, diagnosis, and management. Thank you for the opportunity to participate in this patient's care.     Shahid Charles MD  [facility]  [facility]

## 2017-03-07 NOTE — PATIENT INSTRUCTIONS
Hyperparathyroidism work-up:  - blood work today   - complete the 24 hour urine collection (see instructions below)  - stop the ergocalciferol, start daily cholecalciferol (D3) 400-800 IU daily     - depending on those results, I will determine if we need to pursue a Sestamibi scan  - have a DXA (bone density study done)    Instructions for 24 hour urine     1) Wake up in the morning and let the first void of the morning go into the toilet. 2) Then collect EVERY time you go to the bathroom into the jug and keep in the refrigerator. 3) The next morning COLLECT the very first sample into the jug and then bring it to the lab.      =================================================================  Hyperparathyroidism: Care Instructions  Your Care Instructions  Hyperparathyroidism means that your parathyroid glands are too active. These are tiny glands in the neck. They sit behind the thyroid gland. They make a hormone that helps control how much calcium is in the blood. When these glands make too much hormone, the amount of calcium in the blood goes up. Most people with this problem have no symptoms. But it can cause constipation, nausea, vomiting, fatigue, and depression. It also can lead to high blood pressure, ulcers, kidney stones, and weak bones. This problem often is caused by a tumor on the parathyroid glands. The tumor usually is not cancer. You may need surgery to take out one or more of the glands. Follow-up care is a key part of your treatment and safety. Be sure to make and go to all appointments, and call your doctor if you are having problems. It's also a good idea to know your test results and keep a list of the medicines you take. How can you care for yourself at home? · Take your medicines exactly as prescribed. Call your doctor if you think you are having a problem with your medicine. You will get more details on the specific medicines your doctor prescribes.   · You will need to see your doctor regularly to check your condition. You also will have tests to check the level of calcium in your blood and to make sure your kidneys are working well. · Drink plenty of fluids (enough so that your urine is light yellow or clear like water) to prevent dehydration. Choose water and other caffeine-free clear liquids. If you have kidney, heart, or liver disease and have to limit fluids, talk with your doctor before you increase the amount of fluids you drink. · Get at least 30 minutes of exercise on most days of the week. Walking is a good choice. You also may want to do other activities, such as running, swimming, cycling, or playing tennis or team sports. · If you are taking any diuretic medicines or calcium supplements, talk to your doctor about whether you should keep taking them. When should you call for help? Call 911 anytime you think you may need emergency care. For example, call if:  · You passed out (lost consciousness). Call your doctor now or seek immediate medical care if:  · You are confused or have trouble thinking. · Your vomiting and nausea do not go away with treatment. Watch closely for changes in your health, and be sure to contact your doctor if:  · You get weaker and more tired even after treatment. · You feel depressed or have aches and pains. · You are constipated. · You have increased thirst and urination. · You do not feel hungry. · You do not get better as expected. Where can you learn more? Go to http://rupert-louie.info/. Enter D624 in the search box to learn more about \"Hyperparathyroidism: Care Instructions. \"  Current as of: July 28, 2016  Content Version: 11.1  © 8148-5051 "[employer] Care instructions adapted under license by [employer] (which disclaims liability or warranty for this information).  If you have questions about a medical condition or this instruction, always ask your healthcare professional. [employer] disclaims any warranty or liability for your use of this information.  ========================================================================    If you are having difficulty activating or accessing your [employer] account, please call the [employer] Help Desk at [phone].

## 2017-03-07 NOTE — PROGRESS NOTES
PT DAILY TREATMENT NOTE 2-15    Patient Name: Oswaldo King  Date:3/7/2017  : 1975  [x]  Patient  Verified  Payor: Reinier Granados / Plan: Angela Parra [id number] PPO / Product Type: PPO /    In time: 10:00 AM  Out time: 10:40 AM  Total Treatment Time (min): 40  Timed codes: 40  Visit #: 5    Treatment Area: Cervicalgia [M54.2]    SUBJECTIVE  Pain Level (0-10 scale):0/10 patient reports \"it was one of my goals not to use all of my migraine medication in a month and I had 2 left over. \"    Any medication changes, allergies to medications, adverse drug reactions, diagnosis change, or new procedure performed?: [x] No    [] Yes (see summary sheet for update)  Subjective functional status/changes:   [] No changes reported  See pedro    OBJECTIVE    Modality rationale: decrease pain and increase tissue extensibility to improve the patients ability to working on the computer, working in the yard, bending down to  something heavy   Min Type Additional Details    [] Estim: []Att   []Unatt        []TENS instruct                  []IFC  []Premod   []NMES                     []Other:  []w/US   []w/ice   []w/heat  Position:  Location:    []  Traction: [] Cervical       []Lumbar                       [] Prone          []Supine                       []Intermittent   []Continuous Lbs:  [] before manual  [] after manual  []w/heat    []  Ultrasound: []Continuous   [] Pulsed at:                            []1MHz   []3MHz Location:  W/cm2:    []  Paraffin         Location:  []w/heat    []  Ice     [x]  Heat prior to manual therapy  []  Ice massage Position:supine with LE's elevated  Location:    []  Laser  []  Other: Position:  Location:    []  Vasopneumatic Device Pressure:       [] lo [] med [] hi   Temperature:    [x] Skin assessment post-treatment:  [x]intact []redness- no adverse reaction    []redness  adverse reaction:     25 min Therapeutic Exercise:  [x] See flow sheet :    Added prone kyle to inhibit UT and activate lower trap   Rationale: increase ROM, increase strength and improve coordination to improve the patients ability to working on the computer, working in the yard, bending down to  something heavy    15 min Manual Therapy:  STM R scalenes, R SO muscles / release, R SCM. Rationale: decrease pain, increase ROM, increase tissue extensibility and decrease trigger points  to improve the patients ability to working on the computer, working in the yard, bending down to  something heavy            With   [x] TE   [] TA   [] neuro   [] other: Patient Education: [x] Review HEP    [x] Progressed/Changed HEP based on: seated on swiss ball march and prone over swiss ball  [x] positioning   [x] body mechanics   [] transfers   [] heat/ice application    [x] other:       Other Objective/Functional Measures: nt     Pain Level (0-10 scale) post treatment: 0/10    ASSESSMENT/Changes in Function:   Tolerated prone scapula kyle focusing on UT inhibition and activation lower trap well. Patient with decreased frequency/intensitity of headaches and decreased use of migraine medication. Patient will continue to benefit from skilled PT services to modify and progress therapeutic interventions, address functional mobility deficits, address ROM deficits, address strength deficits, analyze and address soft tissue restrictions, analyze and modify body mechanics/ergonomics and instruct in home and community integration to attain remaining goals. []  See Plan of Care  []  See progress note/recertification  []  See Discharge Summary         Short Term Goals: To be accomplished in 2 weeks  1) Pt will report she is sitting 60 ' maximum at work versus 4 hours for decreased sustained strain to cervical musculature. Progressing - pt is sitting 2 hr at a time. 2) Pt will report she is using document lugo next to monitor and varying left to right to decrease sustained strain to cervical musculature.   MET  3) Pt will report she has improved work station set up including use of lumbar support and moving monitor closer to her to reduce FHP to decrease sustained strain to cervical musculature MET      Long Term Goals: To be accomplished in 4-6 weeks   1) Pt indep.  In final HEP  2) Pt will report decreased frequency of migraines from 7-8 x / month to baseline of 1-2x/month  3) Pt will not need to use full prescription of relpax each month per her report due to decreased severity/frequency of migraines Met  4) Pt will report she avoids sitting for > 30 ' at a time at computer work station  5) Pt will report at least 50% improvement in right sided neck pain since starting PT     PLAN  []  Upgrade activities as tolerated     [x]  Continue plan of care  []  Update interventions per flow sheet       []  Discharge due to:_  []  Other:    Valeri Buckner, SHRUTHI 3/7/2017  10:00 AM

## 2017-03-07 NOTE — MR AVS SNAPSHOT
Visit Information Date & Time Provider Department Dept. Phone Encounter #  
 3/7/2017  8:30 AM Damian Lo, [facility] Diabetes and Endocrinology [phone] Follow-up Instructions Return in about 3 months (around 6/7/2017). Upcoming Health Maintenance Date Due  
 PAP AKA CERVICAL CYTOLOGY 11/13/2015 DTaP/Tdap/Td series (2 - Td) 4/28/2024 Allergies as of 3/7/2017  Review Complete On: 3/7/2017 By: Chan Motta Severity Noted Reaction Type Reactions Pcn [Penicillins]  05/13/2011    Rash Current Immunizations  Reviewed on 1/12/2017 Name Date Influenza Vaccine 12/1/2014, 10/1/2013 Influenza Vaccine Split 11/13/2012 Tdap 4/28/2014 Not reviewed this visit You Were Diagnosed With   
  
 Codes Comments Hypercalcemia    -  Primary ICD-10-CM: C56.28 
ICD-9-CM: 275.42 Hypovitaminosis D     ICD-10-CM: E55.9 ICD-9-CM: 268.9 Vitals BP Pulse Resp Height(growth percentile) Weight(growth percentile) LMP  
 120/88 80 16 5' 3\" (1.6 m) 232 lb (105.2 kg) 02/24/2017 SpO2 BMI OB Status Smoking Status 99% 41.1 kg/m2 Having regular periods Never Smoker Vitals History BMI and BSA Data Body Mass Index Body Surface Area  
 41.1 kg/m 2 2.16 m 2 Preferred Pharmacy Pharmacy Name Phone [id number] [address] [facility] [phone] Your Updated Medication List  
  
   
This list is accurate as of: 3/7/17  9:19 AM.  Always use your most recent med list.  
  
  
  
  
 b complex vitamins tablet Take 1 Tab by mouth daily. ergocalciferol 50,000 unit capsule Commonly known as:  ERGOCALCIFEROL Take 1 Cap by mouth every seven (7) days. escitalopram oxalate 20 mg tablet Commonly known as:  Jam Horton TAKE 1 TABLET EVERY DAY  
  
 ferrous sulfate 325 mg (65 mg iron) tablet  
  
 multivitamin tablet Commonly known as:  ONE A DAY Take 1 Tab by mouth daily. RELPAX 40 mg tablet Generic drug:  eletriptan  
  
 traZODone 50 mg tablet Commonly known as:  DESYREL  
TAKE 1 TABLET BY MOUTH NIGHTLY. We Performed the Following CALCIUM, UR, 24 HR [28361 CPT(R)] CREATININE, UR, 24 HR [44307 CPT(R)] METABOLIC PANEL, COMPREHENSIVE [79138 CPT(R)] PTH INTACT [03433 CPT(R)] VITAMIN D, 25 HYDROXY [id number] CPT(R)] Follow-up Instructions Return in about 3 months (around 6/7/2017). To-Do List   
 03/07/2017 10:00 AM  
  Appointment with Jericho Nascimento PTA at [facility] ([phone]) 03/09/2017 9:00 AM  
  Appointment with Jericho Nascimento PTA at [facility] ([phone])  
  
 03/14/2017 8:30 AM  
  Appointment with Jamil Linder PT at [facility] ([phone])  
  
 03/16/2017 8:30 AM  
  Appointment with Jericho Nascimento PTA at [facility] ([phone]) Patient Instructions Hyperparathyroidism work-up: 
- blood work today  
- complete the 24 hour urine collection (see instructions below) 
- stop the ergocalciferol, start daily cholecalciferol (D3) 400-800 IU daily    
- depending on those results, I will determine if we need to pursue a Sestamibi scan 
- have a DXA (bone density study done) Instructions for 24 hour urine 1) Wake up in the morning and let the first void of the morning go into the toilet. 2) Then collect EVERY time you go to the bathroom into the jug and keep in the refrigerator. 3) The next morning COLLECT the very first sample into the jug and then bring it to the lab.  
  
================================================================= Hyperparathyroidism: Care Instructions Your Care Instructions Hyperparathyroidism means that your parathyroid glands are too active. These are tiny glands in the neck. They sit behind the thyroid gland.  They make a hormone that helps control how much calcium is in the blood. When these glands make too much hormone, the amount of calcium in the blood goes up. Most people with this problem have no symptoms. But it can cause constipation, nausea, vomiting, fatigue, and depression. It also can lead to high blood pressure, ulcers, kidney stones, and weak bones. This problem often is caused by a tumor on the parathyroid glands. The tumor usually is not cancer. You may need surgery to take out one or more of the glands. Follow-up care is a key part of your treatment and safety. Be sure to make and go to all appointments, and call your doctor if you are having problems. It's also a good idea to know your test results and keep a list of the medicines you take. How can you care for yourself at home? · Take your medicines exactly as prescribed. Call your doctor if you think you are having a problem with your medicine. You will get more details on the specific medicines your doctor prescribes. · You will need to see your doctor regularly to check your condition. You also will have tests to check the level of calcium in your blood and to make sure your kidneys are working well. · Drink plenty of fluids (enough so that your urine is light yellow or clear like water) to prevent dehydration. Choose water and other caffeine-free clear liquids. If you have kidney, heart, or liver disease and have to limit fluids, talk with your doctor before you increase the amount of fluids you drink. · Get at least 30 minutes of exercise on most days of the week. Walking is a good choice. You also may want to do other activities, such as running, swimming, cycling, or playing tennis or team sports. · If you are taking any diuretic medicines or calcium supplements, talk to your doctor about whether you should keep taking them. When should you call for help? Call 911 anytime you think you may need emergency care. For example, call if: · You passed out (lost consciousness). Call your doctor now or seek immediate medical care if: 
· You are confused or have trouble thinking. · Your vomiting and nausea do not go away with treatment. Watch closely for changes in your health, and be sure to contact your doctor if: 
· You get weaker and more tired even after treatment. · You feel depressed or have aches and pains. · You are constipated. · You have increased thirst and urination. · You do not feel hungry. · You do not get better as expected. Where can you learn more? Go to http://rupert-louie.info/. Enter D624 in the search box to learn more about \"Hyperparathyroidism: Care Instructions. \" Current as of: July 28, 2016 Content Version: 11.1 © 7176-1367 [employer]. Care instructions adapted under license by [employer] (which disclaims liability or warranty for this information). If you have questions about a medical condition or this instruction, always ask your healthcare professional. [place] [address] any warranty or liability for your use of this information. 
======================================================================== If you are having difficulty activating or accessing your [employer] account, please call the [address] [place] [address] at [phone]. Introducing [facility]! [employer] introduces [employer] patient portal. Now you can access parts of your medical record, email your doctor's office, and request medication refills online. 1. In your internet browser, go to [email or web address] 2. Click on the First Time User? Click Here link in the Sign In box. You will see the New Member Sign Up page. 3. Enter your [employer] Access Code exactly as it appears below. You will not need to use this code after youve completed the sign-up process. If you do not sign up before the expiration date, you must request a new code. · [employer] Access Code: [email or web address] Expires: 5/4/2017 12:43 PM 
 
4. Enter the last four digits of your Social Security Number (xxxx) and Date of Birth (mm/dd/yyyy) as indicated and click Submit. You will be taken to the next sign-up page. 5. Create a [employer] ID. This will be your [employer] login ID and cannot be changed, so think of one that is secure and easy to remember. 6. Create a [employer] password. You can change your password at any time. 7. Enter your Password Reset Question and Answer. This can be used at a later time if you forget your password. 8. Enter your e-mail address. You will receive e-mail notification when new information is available in [address] 9. Click Sign Up. You can now view and download portions of your medical record. 10. Click the Download Summary menu link to download a portable copy of your medical information. If you have questions, please visit the Frequently Asked Questions section of the [employer] website. Remember, [employer] is NOT to be used for urgent needs. For medical emergencies, dial 911. Now available from your iPhone and Android! Please provide this summary of care documentation to your next provider. Your primary care clinician is listed as [address]. If you have any questions after today's visit, please call [phone].

## 2017-03-07 NOTE — PROGRESS NOTES
Chief Complaint   Patient presents with    Abnormal Lab Results     NP is in with elevated calcium levels.

## 2017-03-09 ENCOUNTER — HOSPITAL ENCOUNTER (OUTPATIENT)
Dept: PHYSICAL THERAPY | Age: 42
Discharge: HOME | End: 2017-03-09
Payer: COMMERCIAL

## 2017-03-09 PROCEDURE — 97110 THERAPEUTIC EXERCISES: CPT | Performed by: PHYSICAL THERAPY ASSISTANT

## 2017-03-09 PROCEDURE — 97140 MANUAL THERAPY 1/> REGIONS: CPT | Performed by: PHYSICAL THERAPY ASSISTANT

## 2017-03-09 NOTE — PROGRESS NOTES
PT DAILY TREATMENT NOTE 2-15    Patient Name: Fatimah Bass  Date:3/9/2017  : 1975  [x]  Patient  Verified  Payor: Najma Rainey / Plan: Angela Parra [id number] PPO / Product Type: PPO /    In time: 9:10 AM  Out time: 9:50 AM  Total Treatment Time (min): 40  Timed codes: 40  Visit #: 6    Treatment Area: Cervicalgia [M54.2]    SUBJECTIVE  Pain Level (0-10 scale):0/10 patient reports no migraines at all in march, \"If my neck get's tight or achy i do some of the stretches and that helps. \" \"i'm getting up from my desk and walking around which has helped. \" \"i'm trying to be more conscious of my posture. \"  States getting up kevin hour to 1.5 hours.     Any medication changes, allergies to medications, adverse drug reactions, diagnosis change, or new procedure performed?: [x] No    [] Yes (see summary sheet for update)  Subjective functional status/changes:   [] No changes reported  See pedro    OBJECTIVE    Modality rationale: decrease pain and increase tissue extensibility to improve the patients ability to working on the computer, working in the yard, bending down to  something heavy   Min Type Additional Details    [] Estim: []Att   []Unatt        []TENS instruct                  []IFC  []Premod   []NMES                     []Other:  []w/US   []w/ice   []w/heat  Position:  Location:    []  Traction: [] Cervical       []Lumbar                       [] Prone          []Supine                       []Intermittent   []Continuous Lbs:  [] before manual  [] after manual  []w/heat    []  Ultrasound: []Continuous   [] Pulsed at:                            []1MHz   []3MHz Location:  W/cm2:    []  Paraffin         Location:  []w/heat    []  Ice     [x]  Heat prior to manual therapy  []  Ice massage Position:supine with LE's elevated  Location:    []  Laser  []  Other: Position:  Location:    []  Vasopneumatic Device Pressure:       [] lo [] med [] hi   Temperature:    [x] Skin assessment post-treatment: [x]intact []redness- no adverse reaction    []redness  adverse reaction:     25 min Therapeutic Exercise:  [x] See flow sheet :    Progressed to alternating UE/LE flexion seated on thera ball   Rationale: increase ROM, increase strength and improve coordination to improve the patients ability to working on the computer, working in the yard, bending down to  something heavy    15 min Manual Therapy:  STM R scalenes, R SO muscles / release, R SCM. Rationale: decrease pain, increase ROM, increase tissue extensibility and decrease trigger points  to improve the patients ability to working on the computer, working in the yard, bending down to  something heavy            With   [x] TE   [] TA   [] neuro   [] other: Patient Education: [x] Review HEP    [x] Progressed/Changed HEP based on:   [x] positioning   [x] body mechanics   [] transfers   [] heat/ice application    [x] other:       Other Objective/Functional Measures: decreased TTP R UT, SCM and scalenes     Pain Level (0-10 scale) post treatment: 0/10    ASSESSMENT/Changes in Function:   Challenged with addition of UE flexion during seated marches on theraball. Patient with second visit without neck pain or migraine in the past month. Improved soft tissue mobility and decreased TTP along R SCM, scalenes, and UT muscles    Patient will continue to benefit from skilled PT services to modify and progress therapeutic interventions, address functional mobility deficits, address ROM deficits, address strength deficits, analyze and address soft tissue restrictions, analyze and modify body mechanics/ergonomics and instruct in home and community integration to attain remaining goals. []  See Plan of Care  []  See progress note/recertification  []  See Discharge Summary         Short Term Goals: To be accomplished in 2 weeks  1) Pt will report she is sitting 60 ' maximum at work versus 4 hours for decreased sustained strain to cervical musculature. Progressing - pt is sitting 2 hr at a time. 2) Pt will report she is using document lugo next to monitor and varying left to right to decrease sustained strain to cervical musculature. MET  3) Pt will report she has improved work station set up including use of lumbar support and moving monitor closer to her to reduce FHP to decrease sustained strain to cervical musculature MET      Long Term Goals: To be accomplished in 4-6 weeks   1) Pt indep.  In final HEP  2) Pt will report decreased frequency of migraines from 7-8 x / month to baseline of 1-2x/month  3) Pt will not need to use full prescription of relpax each month per her report due to decreased severity/frequency of migraines Met  4) Pt will report she avoids sitting for > 30 ' at a time at computer work station Progressing towards  5) Pt will report at least 50% improvement in right sided neck pain since starting PT     PLAN  []  Upgrade activities as tolerated     [x]  Continue plan of care  []  Update interventions per flow sheet       []  Discharge due to:_  []  Other:    Joseph Wyatt PTA 3/9/2017  9:10 AM

## 2017-03-14 ENCOUNTER — HOSPITAL ENCOUNTER (OUTPATIENT)
Dept: PHYSICAL THERAPY | Age: 42
Discharge: HOME | End: 2017-03-14
Payer: COMMERCIAL

## 2017-03-14 PROCEDURE — 97140 MANUAL THERAPY 1/> REGIONS: CPT | Performed by: PHYSICAL THERAPIST

## 2017-03-14 PROCEDURE — 97110 THERAPEUTIC EXERCISES: CPT | Performed by: PHYSICAL THERAPIST

## 2017-03-14 NOTE — PROGRESS NOTES
PT Re-eval / DAILY TREATMENT NOTE 2-15    Patient Name: Jose Morales  Date:3/14/2017  : 1975  [x]  Patient  Verified  Payor: [employer] / Plan: Angela Parra [id number] PPO / Product Type: PPO /    In time: 845 AM  Out time: 945  AM  Total Treatment Time (min): 60  Timed codes: 60  Visit #: 7    Treatment Area: Cervicalgia [M54.2]    SUBJECTIVE  Pain Level (0-10 scale):0/10 pain. Pt reports only 3 migraines in past month. Pt reports after Thursday she thinks she is ready to continue on her own. Pt reports she is sitting for 90 ' at a time. Any medication changes, allergies to medications, adverse drug reactions, diagnosis change, or new procedure performed?: [x] No    [] Yes (see summary sheet for update)  Subjective functional status/changes:   [] No changes reported  See abovie    OBJECTIVE    Palpation:  Increased TTP right compared to left : sub occipitals and scalenes. CAROM:  Flexion, ext, LF and ROT all WNL and pain free.       Modality rationale: decrease pain and increase tissue extensibility to improve the patients ability to working on the computer, working in the yard, bending down to  something heavy   Min Type Additional Details    [] Estim: []Att   []Unatt        []TENS instruct                  []IFC  []Premod   []NMES                     []Other:  []w/US   []w/ice   []w/heat  Position:  Location:    []  Traction: [] Cervical       []Lumbar                       [] Prone          []Supine                       []Intermittent   []Continuous Lbs:  [] before manual  [] after manual  []w/heat    []  Ultrasound: []Continuous   [] Pulsed at:                            []1MHz   []3MHz Location:  W/cm2:    []  Paraffin         Location:  []w/heat    []  Ice     []  Heat prior to manual therapy  []  Ice massage Position:supine with LE's elevated  Location:    []  Laser  []  Other: Position:  Location:    []  Vasopneumatic Device Pressure:       [] lo [] med [] hi Temperature:    [x] Skin assessment post-treatment:  [x]intact []redness- no adverse reaction    []redness  adverse reaction:     45 min Therapeutic Exercise:  [x] See flow sheet :    Reviewed continued HEP upon D/C and encouraged pt to perform regular cardiovascular exercise to counter sedentary job as this would likely be beneficial re: neck pain and HA's as well. Progressed standing \"W\" with foam roller to include red t-band, added foam roller pec stretch for posture, attempted prone over swiss ball opposite arm/leg lift with CCF and lower cervical extension but pt noting pain R lower neck, discontinued. Attempted with LE only, still painful R lower neck, discontinued. Rationale: increase ROM, increase strength and improve coordination to improve the patients ability to working on the computer, working in the yard, bending down to  something heavy    15 min Manual Therapy:  STM R scalenes, R SO muscles / release. Rationale: decrease pain, increase ROM, increase tissue extensibility and decrease trigger points  to improve the patients ability to working on the computer, working in the yard, bending down to  something heavy            With   [x] TE   [] TA   [] neuro   [] other: Patient Education: [x] Review HEP    [x] Progressed/Changed HEP based on:   [x] positioning   [x] body mechanics   [] transfers   [] heat/ice application    [x] other:       Other Objective/Functional Measures: see above    Pain Level (0-10 scale) post treatment: 0/10    ASSESSMENT/Changes in Function:   Pt with cervical AROM full and pain free, decreased frequency of HA's and improved awareness of posture. Pt also has made adjustments to work station as recommended since starting PT. Pt with decreased severity in TTP but still increased right scalenes and sub occipitals as compared to left.   Pt with one more visit scheduled Thursday and eager to review/learn further HEP then will likely be ready to continue on her own and D/C to HEP. Patient will continue to benefit from skilled PT services to modify and progress therapeutic interventions, address functional mobility deficits, address ROM deficits, address strength deficits, analyze and address soft tissue restrictions, analyze and modify body mechanics/ergonomics and instruct in home and community integration to attain remaining goals. []  See Plan of Care  []  See progress note/recertification  []  See Discharge Summary         Short Term Goals: To be accomplished in 2 weeks  1) Pt will report she is sitting 60 ' maximum at work versus 4 hours for decreased sustained strain to cervical musculature. Not met, progressing, sits 80 ' at a time. 2) Pt will report she is using document lugo next to monitor and varying left to right to decrease sustained strain to cervical musculature. MET  3) Pt will report she has improved work station set up including use of lumbar support and moving monitor closer to her to reduce FHP to decrease sustained strain to cervical musculature MET      Long Term Goals: To be accomplished in 4-6 weeks   1) Pt indep. In final HEP  2) Pt will report decreased frequency of migraines from 7-8 x / month to baseline of 1-2x/month Progressing - pt has migraines 3 x/month. 3) Pt will not need to use full prescription of relpax each month per her report due to decreased severity/frequency of migraines Met  4) Pt will report she avoids sitting for > 30 ' at a time at computer work station Progressing towards  5) Pt will report at least 50% improvement in right sided neck pain since starting PT     PLAN  [x]  Upgrade activities as tolerated     [x]  Continue plan of care  []  Update interventions per flow sheet       []  Discharge due to:_  [x]  Other: progress HEP as needed, assess LTG 5, give FOTO.     Slade Keys, PT 3/14/2017  9:10 AM

## 2017-03-16 ENCOUNTER — HOSPITAL ENCOUNTER (OUTPATIENT)
Dept: PHYSICAL THERAPY | Age: 42
Discharge: HOME | End: 2017-03-16
Payer: COMMERCIAL

## 2017-03-16 PROCEDURE — 97110 THERAPEUTIC EXERCISES: CPT | Performed by: PHYSICAL THERAPY ASSISTANT

## 2017-03-16 NOTE — PROGRESS NOTES
PT Re-eval / DAILY TREATMENT NOTE 2-15    Patient Name: Norman Ruelas  Date:3/16/2017  : 1975  [x]  Patient  Verified  Payor: Junior Ho / Plan: Angela Parra [id number] PPO / Product Type: PPO /    In time: 8:40 AM  Out time: 915  AM  Total Treatment Time (min): 35  Timed codes: 35  Visit #: 8    Treatment Area: Cervicalgia [M54.2]    SUBJECTIVE  Pain Level (0-10 scale):0/10 pain. 1.5/10 patient states a little soreness on R side of neck, states 50% improvement since start of PT \"I've definitely stopped craning (demonstrates cervical protraction).       Any medication changes, allergies to medications, adverse drug reactions, diagnosis change, or new procedure performed?: [x] No    [] Yes (see summary sheet for update)  Subjective functional status/changes:   [] No changes reported  See pedro    OBJECTIVE      Modality rationale: decrease pain and increase tissue extensibility to improve the patients ability to working on the computer, working in the yard, bending down to  something heavy   Min Type Additional Details    [] Estim: []Att   []Unatt        []TENS instruct                  []IFC  []Premod   []NMES                     []Other:  []w/US   []w/ice   []w/heat  Position:  Location:    []  Traction: [] Cervical       []Lumbar                       [] Prone          []Supine                       []Intermittent   []Continuous Lbs:  [] before manual  [] after manual  []w/heat    []  Ultrasound: []Continuous   [] Pulsed at:                            []1MHz   []3MHz Location:  W/cm2:    []  Paraffin         Location:  []w/heat    []  Ice     []  Heat prior to manual therapy  []  Ice massage Position:supine with LE's elevated  Location:    []  Laser  []  Other: Position:  Location:    []  Vasopneumatic Device Pressure:       [] lo [] med [] hi   Temperature:    [x] Skin assessment post-treatment:  [x]intact []redness- no adverse reaction    []redness  adverse reaction:     35 min Therapeutic Exercise:  [x] See flow sheet :        Rationale: increase ROM, increase strength and improve coordination to improve the patients ability to working on the computer, working in the yard, bending down to  something heavy    omit min Manual Therapy:  STM R scalenes, R SO muscles / release. Rationale: decrease pain, increase ROM, increase tissue extensibility and decrease trigger points  to improve the patients ability to working on the computer, working in the yard, bending down to  something heavy            With   [x] TE   [] TA   [] neuro   [] other: Patient Education: [x] Review HEP    [x] Progressed/Changed HEP based on:   [x] positioning   [x] body mechanics   [] transfers   [] heat/ice application    [x] other:  Discussed use of theracane for trigger point release     Other Objective/Functional Measures:     FOTO Outcome Measure:  Initial: 68/100  Current: 75/100    Pain Level (0-10 scale) post treatment: 0/10    ASSESSMENT/Changes in Function:   Patient tolerated session well, reviewed final HEP as well as discussed possibility of theracane for self trigger point release. Patient with 7 point increase in FOTO Outcome measure indicating improvement with functional mobility. She has met 2/3 STG's, and 3/5 LTG's, patient is to be discharged at this time due to patient request to independent program.     []  See Plan of Care  []  See progress note/recertification  []  See Discharge Summary         Short Term Goals: To be accomplished in 2 weeks  1) Pt will report she is sitting 60 ' maximum at work versus 4 hours for decreased sustained strain to cervical musculature. Not met, progressing, sits 80 ' at a time. 2) Pt will report she is using document lugo next to monitor and varying left to right to decrease sustained strain to cervical musculature.   MET  3) Pt will report she has improved work station set up including use of lumbar support and moving monitor closer to her to reduce FHP to decrease sustained strain to cervical musculature MET      Long Term Goals: To be accomplished in 4-6 weeks   1) Pt indep. In final HEP Met  2) Pt will report decreased frequency of migraines from 7-8 x / month to baseline of 1-2x/month Progressing - pt has migraines 3 x/month.  Improved but notmet  3) Pt will not need to use full prescription of relpax each month per her report due to decreased severity/frequency of migraines Met  4) Pt will report she avoids sitting for > 30 ' at a time at computer work station Progressing towards  5) Pt will report at least 50% improvement in right sided neck pain since starting PT Met     PLAN  [x]  Upgrade activities as tolerated     [x]  Continue plan of care  []  Update interventions per flow sheet       [x]  Discharge due to: patient request  []  Other:     Susannah Ferrara PTA 3/16/2017  8:40 AM

## 2017-12-11 ENCOUNTER — OFFICE VISIT (OUTPATIENT)
Dept: INTERNAL MEDICINE CLINIC | Age: 42
End: 2017-12-11

## 2017-12-11 VITALS
SYSTOLIC BLOOD PRESSURE: 134 MMHG | HEART RATE: 85 BPM | HEIGHT: 63 IN | BODY MASS INDEX: 42.74 KG/M2 | OXYGEN SATURATION: 95 % | WEIGHT: 241.2 LBS | DIASTOLIC BLOOD PRESSURE: 93 MMHG | TEMPERATURE: 98 F | RESPIRATION RATE: 16 BRPM

## 2017-12-11 DIAGNOSIS — Z00.00 WELL WOMAN EXAM (NO GYNECOLOGICAL EXAM): ICD-10-CM

## 2017-12-11 DIAGNOSIS — R07.89 CHEST TIGHTNESS: ICD-10-CM

## 2017-12-11 DIAGNOSIS — G43.909 MIGRAINE WITHOUT STATUS MIGRAINOSUS, NOT INTRACTABLE, UNSPECIFIED MIGRAINE TYPE: ICD-10-CM

## 2017-12-11 DIAGNOSIS — R06.09 DOE (DYSPNEA ON EXERTION): Primary | ICD-10-CM

## 2017-12-11 DIAGNOSIS — R63.5 WEIGHT GAIN: ICD-10-CM

## 2017-12-11 DIAGNOSIS — Z12.39 BREAST SCREENING: ICD-10-CM

## 2017-12-11 PROBLEM — Z02.82 ADOPTED PERSON: Status: ACTIVE | Noted: 2017-12-11

## 2017-12-11 RX ORDER — ELETRIPTAN HYDROBROMIDE 40 MG/1
40 TABLET, FILM COATED ORAL
Qty: 8 TAB | Refills: 0 | Status: SHIPPED | OUTPATIENT
Start: 2017-12-11 | End: 2018-01-15 | Stop reason: SDUPTHER

## 2017-12-11 NOTE — PROGRESS NOTES
Chief Complaint   Patient presents with   [facility] Establish Care     1. Have you been to the ER, urgent care clinic since your last visit? Hospitalized since your last visit? No    2. Have you seen or consulted any other health care providers outside of the [address] [place] [address] since your last visit? Include any pap smears or colon screening.  No     Weight management    SOB when walking up hill, chest tightness    Depressed-sleeping a lot and motivating self

## 2017-12-11 NOTE — PROGRESS NOTES
HISTORY OF PRESENT ILLNESS  Tiffani Thapa is a 43 y.o. female   Tiffani Thapa is new to my practice. Prior care: her prior care was with Dr. Bhavin Erazo. Last seen there   Records have been reviewed. Health Maintenance  Health Maintenance   Topic Date Due    PAP AKA CERVICAL CYTOLOGY  2015    DTaP/Tdap/Td series (2 - Td) 2024    Influenza Age 5 to Adult  Addressed       Shortness of Breath   This is a new (6 months ) problem. Episode frequency: when climbing hill associated The current episode started more than 1 week ago (6 months ). The problem has been gradually worsening. Associated symptoms include chest pain (chest tightness when SOB). Pertinent negatives include no fever, no cough and no sputum production. Leg swelling: ankles only in summer and end of day mostly  She has tried nothing for the symptoms. Associated medical issues do not include asthma or pneumonia. Cardiovascular Review:  The patient has obesity Body mass index is 42.52 kg/(m^2). Has completed Twelixir twice both times interrupted. Tried Weight Watchers- difficulty tracking her food. .   Diet and Lifestyle: nonsmoker  Home BP Monitoring: is not measured at home. Pertinent ROS: no TIA's, no chest pain on exertion, no dyspnea on exertion, no swelling of ankles. SHx: Dad  on  after perdue with dementia. She took time off  Grew up in [place] [address] and [address] [place] [address]; Mom in [place], [place] for work; Works at [employer] as      Depression  Patient is seen for followup of depression. Treatment includes Lexapro and no other therapies. Her father recently  from dementia. she denies suicidal thoughts without plan and suicidal thoughts with specific plan. she experiences the following side effects from the treatment: none.     PHQ over the last two weeks 2017   Little interest or pleasure in doing things Nearly every day   Feeling down, depressed or hopeless More than half the days   Total Score PHQ 2 5 Trouble falling or staying asleep, or sleeping too much Nearly every day   Feeling tired or having little energy Nearly every day   Poor appetite or overeating Nearly every day   Feeling bad about yourself - or that you are a failure or have let yourself or your family down Several days   Trouble concentrating on things such as school, work, reading or watching TV More than half the days   Moving or speaking so slowly that other people could have noticed; or the opposite being so fidgety that others notice Not at all   Thoughts of being better off dead, or hurting yourself in some way Not at all   PHQ 9 Score 17         Review of Systems   Constitutional: Negative for fever. Respiratory: Positive for shortness of breath. Negative for cough and sputum production. Cardiovascular: Positive for chest pain (chest tightness when SOB). Leg swelling: ankles only in summer and end of day mostly      Patient Active Problem List    Diagnosis Date Noted    Iron deficiency anemia due to chronic blood loss 06/23/2016    Vitamin D insufficiency 06/23/2016    Abnormal findings on esophagogastroduodenoscopy (EGD) 06/23/2016    Infected lesion in nose 10/04/2015    Hypercalcemia 10/04/2015    MAGGY (generalized anxiety disorder) 09/30/2015    Hypovitaminosis D 01/26/2015    Morbid obesity due to excess calories (HCC) 04/28/2014    Mild sleep apnea 04/28/2014    Migraines        Current Outpatient Prescriptions   Medication Sig Dispense Refill    escitalopram oxalate (LEXAPRO) 20 mg tablet TAKE 1 TABLET EVERY DAY 30 Tab 5    multivitamin (ONE A DAY) tablet Take 1 Tab by mouth daily.  ferrous sulfate 325 mg (65 mg iron) tablet   3    RELPAX 40 mg tablet Take 40 mg by mouth once as needed. 11    traZODone (DESYREL) 50 mg tablet TAKE 1 TABLET BY MOUTH NIGHTLY. 30 Tab 0    ergocalciferol (ERGOCALCIFEROL) 50,000 unit capsule Take 1 Cap by mouth every seven (7) days.  12 Cap 0    b complex vitamins tablet Take 1 Tab by mouth daily. Allergies   Allergen Reactions    Hay Fever And Allergy Relief Itching     Rash, sneezing    Pcn [Penicillins] Rash      Visit Vitals    BP (!) 134/93 (BP 1 Location: Right arm, BP Patient Position: Sitting)    Pulse 85    Temp 98 °F (36.7 °C) (Oral)    Resp 16    Ht 5' 3.15\" (1.604 m)    Wt 241 lb 3.2 oz (109.4 kg)    SpO2 95%    BMI 42.52 kg/m2       Physical Exam   Constitutional: She is oriented to person, place, and time. She appears well-developed. No distress. Eyes: Conjunctivae are normal.   Neck: Neck supple. No thyromegaly present. Cardiovascular: Normal rate, regular rhythm and normal heart sounds. Pulmonary/Chest: Effort normal and breath sounds normal. No respiratory distress. She has no wheezes. She has no rales. She exhibits no tenderness. Neurological: She is alert and oriented to person, place, and time. Skin: Skin is warm. Psychiatric: She has a normal mood and affect. EKG: normal EKG, normal sinus rhythm. Non specific Twave changes in V3 and AVF (lead placement?)     ASSESSMENT and PLAN  Diagnoses and all orders for this visit:    1. BRUCE (dyspnea on exertion)-likely due to deconditioning from increased weight gain. However given her unknown family history. She would benefit from further cardiac evaluation. Fortunately EKG today shows no definitive ischemic changes. Referral to cardiology given. If cardiac workup is negative will pursue a pulmonary evaluation. In the aunts admittedly she has been warned of the ER precautions. -     AMB POC EKG ROUTINE W/ 12 LEADS, INTER & REP  -     REFERRAL TO CARDIOLOGY    2. Weight gain- I have reviewed/discussed the above normal BMI with the patient. I have recommended the following interventions: dietary management education, guidance, and counseling . Mikel Yates 3.  Chest tightness- see problem 1   -     AMB POC EKG ROUTINE W/ 12 LEADS, INTER & REP  -     REFERRAL TO CARDIOLOGY    4. Breast screening  -     [facility] MAMMO BI SCREENING INCL CAD; Future    5. Well woman exam (no gynecological exam)- Health Maintenance reviewed - patient asked to schedule her pap smear, patient asked to schedule her mammogram.    -     CBC WITH AUTOMATED DIFF  -     LIPID PANEL  -     METABOLIC PANEL, COMPREHENSIVE  -     THYROID PANEL  -     TSH 3RD GENERATION  -     VITAMIN D, 25 HYDROXY    6. Migraine without status migrainosus, not intractable, unspecified migraine type- controlled. -     RELPAX 40 mg tablet; Take 1 Tab by mouth daily as needed. 7. Depression- worsen due to grief. Would benefit from seeing a therapist. Patient Education:  Reviewed concept of depression as biochemical imbalance of neurotransmitters and rationale for treatment. Instructed patient to contact office or 911 promptly should condition worsen or any new symptoms appear and provided on-call telephone numbers. IF THE PATIENT HAS ANY SUICIDAL OR HOMICIDAL IDEATION, CALL THE OFFICE, DISCUSS WITH A SUPPORT MEMBER OR GO TO THE ER IMMEDIATELY. Patient was agreeable with this      Follow-up Disposition:  Return in about 6 weeks (around 1/22/2018) for follow up depression. Medication risks/benefits/costs/interactions/alternatives discussed with patient. Harsh Nick  was given an after visit summary which includes diagnoses, current medications, & vitals. she expressed understanding with the diagnosis and plan.

## 2017-12-11 NOTE — MR AVS SNAPSHOT
Visit Information Date & Time Provider Department Dept. Phone Encounter #  
 12/11/2017  3:15 PM Chris Pabon MD [facility] Internal Medicine [phone] [id number] Follow-up Instructions Return in about 6 weeks (around 1/22/2018) for follow up depression. Upcoming Health Maintenance Date Due  
 PAP AKA CERVICAL CYTOLOGY 11/13/2015 DTaP/Tdap/Td series (2 - Td) 4/28/2024 Allergies as of 12/11/2017  Review Complete On: 12/11/2017 By: Chris Pabon MD  
  
 Severity Noted Reaction Type Reactions Hay Fever And Allergy Relief  12/11/2017    Itching Rash, sneezing Pcn [Penicillins]  05/13/2011    Rash Current Immunizations  Reviewed on 1/12/2017 Name Date Influenza Vaccine 12/1/2014, 10/1/2013 Influenza Vaccine Split 11/13/2012 Tdap 4/28/2014 Not reviewed this visit You Were Diagnosed With   
  
 Codes Comments BRUCE (dyspnea on exertion)    -  Primary ICD-10-CM: R06.09 
ICD-9-CM: 786.09 Weight gain     ICD-10-CM: R63.5 ICD-9-CM: 783.1 Chest tightness     ICD-10-CM: R07.89 ICD-9-CM: 786.59 Breast screening     ICD-10-CM: Z12.31 
ICD-9-CM: V76.10 Well woman exam (no gynecological exam)     ICD-10-CM: Z00.00 ICD-9-CM: V70.0 Migraine without status migrainosus, not intractable, unspecified migraine type     ICD-10-CM: G43.909 ICD-9-CM: 346.90 Vitals BP Pulse Temp Resp Height(growth percentile) Weight(growth percentile) (!) 134/93 (BP 1 Location: Right arm, BP Patient Position: Sitting) 85 98 °F (36.7 °C) (Oral) 16 5' 3.15\" (1.604 m) 241 lb 3.2 oz (109.4 kg) LMP SpO2 BMI OB Status Smoking Status 11/29/2017 95% 42.52 kg/m2 Having regular periods Never Smoker Vitals History BMI and BSA Data Body Mass Index Body Surface Area 42.52 kg/m 2 2.21 m 2 Preferred Pharmacy Pharmacy Name Phone  [id number] [place] [phone] Your Updated Medication List  
  
   
This list is accurate as of: 12/11/17  4:11 PM.  Always use your most recent med list.  
  
  
  
  
 b complex vitamins tablet Take 1 Tab by mouth daily. ergocalciferol 50,000 unit capsule Commonly known as:  ERGOCALCIFEROL Take 1 Cap by mouth every seven (7) days. escitalopram oxalate 20 mg tablet Commonly known as:  [place] TAKE 1 TABLET EVERY DAY  
  
 ferrous sulfate 325 mg (65 mg iron) tablet  
  
 multivitamin tablet Commonly known as:  ONE A DAY Take 1 Tab by mouth daily. RELPAX 40 mg tablet Generic drug:  eletriptan Take 1 Tab by mouth daily as needed. traZODone 50 mg tablet Commonly known as:  DESYREL  
TAKE 1 TABLET BY MOUTH NIGHTLY. Prescriptions Sent to Pharmacy Refills RELPAX 40 mg tablet 0 Sig: Take 1 Tab by mouth daily as needed. Class: Normal  
 Pharmacy: [facility]/pharmacy #[address] [place] [address] Ph #: [phone] Route: Oral  
  
We Performed the Following AMB POC EKG ROUTINE W/ 12 LEADS, INTER & REP [64275 CPT(R)] CBC WITH AUTOMATED DIFF [99950 CPT(R)] LIPID PANEL [92993 CPT(R)] METABOLIC PANEL, COMPREHENSIVE [55546 CPT(R)] REFERRAL TO CARDIOLOGY [SVS51 Custom] THYROID PANEL [id number] CPT(R)] TSH 3RD GENERATION [07764 CPT(R)] VITAMIN D, 25 HYDROXY [id number] CPT(R)] Follow-up Instructions Return in about 6 weeks (around 1/22/2018) for follow up depression. To-Do List   
 06/13/2018 Imaging:  [facility] MAMMO BI SCREENING INCL CAD Referral Information Referral ID Referred By Referred To  
  
 [id number] Maddy [address] 36., MD [address] [place], [address] Phone: [phone] Fax: [phone] Visits Status Start Date End Date 1 New Request 12/11/17 12/11/18 If your referral has a status of pending review or denied, additional information will be sent to support the outcome of this decision. Patient Instructions It was a pleasure to meet you! As discussed: 
 
 
I have ordered your age appropriate labs please complete them. You will need to fast 10-12hrs before your lab appointment. Complete labs two weeks before your next appointment. Schedule with your gynecologist.  
 
Grief (Actual/Anticipated): Care Instructions Your Care Instructions Grief is your emotional reaction to a major loss. The words \"sorrow\" and \"heartache\" often are used to describe feelings of grief. You feel grief when you lose a beloved person, pet, place, or thing. It is also natural to feel grief when you lose a valued way of life, such as a job, marriage, or good health. You may begin to grieve before a loss occurs. You may grieve for a loved one who is sick and dying. Children and adults often feel the pain of loss before a big move or divorce. This type of grief helps you get ready for a loss. Grief is different for each person. There is no \"normal\" or \"expected\" period of time for grieving. Some people adjust to their loss within a couple of months. Others may take 2 years or longer, especially if their lives were changed a lot or if the loss was sudden and shocking. Grieving can cause problems such as headaches, loss of appetite, and trouble with thinking or sleeping. You may withdraw from friends and family and behave in ways that are unusual for you. Grief may cause you to question your beliefs and views about life. Grief is natural and does not require medical treatment. But if you have trouble sleeping, it may help to take sleeping pills for a short time. It may help to talk with people who have been through or are going through similar losses. You may also want to talk to a counselor about your feelings.  Talking about your loss, sharing your cares and concerns, and getting support from others are important parts of healthy grieving. Follow-up care is a key part of your treatment and safety. Be sure to make and go to all appointments, and call your doctor if you are having problems. It's also a good idea to know your test results and keep a list of the medicines you take. How can you care for yourself at home? · Get enough sleep. Your mind helps make sense of your life while you sleep. Missing sleep can lead to illness and make it harder for you to deal with your grief. · Eat healthy foods. Try to avoid eating only foods that give you comfort. Ask someone to join you for a meal if you do not like eating alone. Consider taking a multivitamin every day. · Get some exercise every day. Even a walk can help you deal with your grief. Other exercises, such as yoga, can also help you manage stress. · Comfort yourself. Take time to look at photos or use special items that make you feel better. · Stay involved in your life. Do not withdraw from the activities you enjoy. People you know at work, [employer], clubs, or other groups can help you get through your period of grief. · Think about joining a support group to help you deal with your grief. There are many support groups to help people recover from grief. When should you call for help? Call 911 anytime you think you may need emergency care. For example, call if: 
? · You feel you cannot stop from hurting yourself or someone else. ? Watch closely for changes in your health, and be sure to contact your doctor if: 
? · You think you may be depressed. ? · You do not get better as expected. Where can you learn more? Go to http://rupert-louie.info/. Enter H249 in the search box to learn more about \"Grief (Actual/Anticipated): Care Instructions. \" Current as of: September 24, 2016 Content Version: 11.4 © 7732-6566 Healthwise, Incorporated.  Care instructions adapted under license by [address] (which disclaims liability or warranty for this information). If you have questions about a medical condition or this instruction, always ask your healthcare professional. [address] any warranty or liability for your use of this information. Shortness of Breath: Care Instructions Your Care Instructions Shortness of breath has many causes. Sometimes conditions such as anxiety can lead to shortness of breath. Some people get mild shortness of breath when they exercise. Trouble breathing also can be a symptom of a serious problem, such as asthma, lung disease, emphysema, heart problems, and pneumonia. If your shortness of breath continues, you may need tests and treatment. Watch for any changes in your breathing and other symptoms. Follow-up care is a key part of your treatment and safety. Be sure to make and go to all appointments, and call your doctor if you are having problems. It's also a good idea to know your test results and keep a list of the medicines you take. How can you care for yourself at home? · Do not smoke or allow others to smoke around you. If you need help quitting, talk to your doctor about stop-smoking programs and medicines. These can increase your chances of quitting for good. · Get plenty of rest and sleep. · Take your medicines exactly as prescribed. Call your doctor if you think you are having a problem with your medicine. · Find healthy ways to deal with stress. ¨ Exercise daily. ¨ Get plenty of sleep. ¨ Eat regularly and well. When should you call for help? Call 911 anytime you think you may need emergency care. For example, call if: 
? · You have severe shortness of breath. ? · You have symptoms of a heart attack. These may include: ¨ Chest pain or pressure, or a strange feeling in the chest. 
¨ Sweating. ¨ Shortness of breath. ¨ Nausea or vomiting. ¨ Pain, pressure, or a strange feeling in the back, neck, jaw, or upper belly or in one or both shoulders or arms. ¨ Lightheadedness or sudden weakness. ¨ A fast or irregular heartbeat. After you call 911, the  may tell you to chew 1 adult-strength or 2 to 4 low-dose aspirin. Wait for an ambulance. Do not try to drive yourself. ?Call your doctor now or seek immediate medical care if: 
? · Your shortness of breath gets worse or you start to wheeze. Wheezing is a high-pitched sound when you breathe. ? · You wake up at night out of breath or have to prop your head up on several pillows to breathe. ? · You are short of breath after only light activity or while at rest. ? Watch closely for changes in your health, and be sure to contact your doctor if: 
? · You do not get better over the next 1 to 2 days. Where can you learn more? Go to http://rupert-louie.info/. Enter S780 in the search box to learn more about \"Shortness of Breath: Care Instructions. \" Current as of: May 12, 2017 Content Version: 11.4 © 3902-5112 Localist. Care instructions adapted under license by "[employer] (which disclaims liability or warranty for this information). If you have questions about a medical condition or this instruction, always ask your healthcare professional. [place] [address] any warranty or liability for your use of this information. Introducing [facility] & HEALTH SERVICES! Anthony Quinteros introduces [employer] patient portal. Now you can access parts of your medical record, email your doctor's office, and request medication refills online. 1. In your internet browser, go to [email or web address] 2. Click on the First Time User? Click Here link in the Sign In box. You will see the New Member Sign Up page. 3. Enter your [employer] Access Code exactly as it appears below.  You will not need to use this code after youve completed the sign-up process. If you do not sign up before the expiration date, you must request a new code. · [employer] Access Code: [email or web address] Expires: 3/11/2018  4:11 PM 
 
4. Enter the last four digits of your Social Security Number (xxxx) and Date of Birth (mm/dd/yyyy) as indicated and click Submit. You will be taken to the next sign-up page. 5. Create a Cytosorbents ID. This will be your [employer] login ID and cannot be changed, so think of one that is secure and easy to remember. 6. Create a [employer] password. You can change your password at any time. 7. Enter your Password Reset Question and Answer. This can be used at a later time if you forget your password. 8. Enter your e-mail address. You will receive e-mail notification when new information is available in [address] 9. Click Sign Up. You can now view and download portions of your medical record. 10. Click the Download Summary menu link to download a portable copy of your medical information. If you have questions, please visit the Frequently Asked Questions section of the [employer]osorb[employer] website. Remember, [employer] is NOT to be used for urgent needs. For medical emergencies, dial 911. Now available from your iPhone and Android! Please provide this summary of care documentation to your next provider. Your primary care clinician is listed as Olivia Krishnan. If you have any questions after today's visit, please call [phone].

## 2017-12-14 ENCOUNTER — OFFICE VISIT (OUTPATIENT)
Dept: CARDIOLOGY CLINIC | Age: 42
End: 2017-12-14

## 2017-12-14 VITALS
DIASTOLIC BLOOD PRESSURE: 80 MMHG | SYSTOLIC BLOOD PRESSURE: 120 MMHG | WEIGHT: 239.2 LBS | HEIGHT: 63 IN | BODY MASS INDEX: 42.38 KG/M2 | RESPIRATION RATE: 16 BRPM | HEART RATE: 66 BPM

## 2017-12-14 DIAGNOSIS — I20.0 UNSTABLE ANGINA (HCC): Primary | ICD-10-CM

## 2017-12-14 DIAGNOSIS — E78.5 BORDERLINE HYPERLIPIDEMIA: ICD-10-CM

## 2017-12-14 NOTE — MR AVS SNAPSHOT
Visit Information Date & Time Provider Department Dept. Phone Encounter #  
 12/14/2017  9:40 AM Luca Montes De Oca MD [facility]ene Lees [phone] [id number] Your Appointments 1/24/2018  8:45 AM  
ROUTINE CARE with Aspen Thornton MD  
[facility] Internal Medicine [facility]) Appt Note: f/u 6 wks [address] [facility] [address] [facility] [address]  
[address] [facility] Upcoming Health Maintenance Date Due  
 PAP AKA CERVICAL CYTOLOGY 11/13/2015 DTaP/Tdap/Td series (2 - Td) 4/28/2024 Allergies as of 12/14/2017  Review Complete On: 12/14/2017 By: Dionna Lutz Severity Noted Reaction Type Reactions Hay Fever And Allergy Relief  12/11/2017    Itching Rash, sneezing Pcn [Penicillins]  05/13/2011    Rash Current Immunizations  Reviewed on 1/12/2017 Name Date Influenza Vaccine 12/1/2014, 10/1/2013 Influenza Vaccine Split 11/13/2012 Tdap 4/28/2014 Not reviewed this visit You Were Diagnosed With   
  
 Codes Comments Unstable angina (HCC)    -  Primary ICD-10-CM: I20.0 ICD-9-CM: 411.1 Vitals BP Pulse Resp Height(growth percentile) Weight(growth percentile) LMP  
 120/80 (BP 1 Location: Left arm, BP Patient Position: Sitting) 66 16 5' 3.15\" (1.604 m) 239 lb 3.2 oz (108.5 kg) 11/29/2017 BMI OB Status Smoking Status 42.17 kg/m2 Having regular periods Never Smoker Vitals History BMI and BSA Data Body Mass Index Body Surface Area  
 42.17 kg/m 2 2.2 m 2 Preferred Pharmacy Pharmacy Name Phone [id number] [place] [email or web address] [facility] [phone] Your Updated Medication List  
  
   
This list is accurate as of: 12/14/17 10:55 AM.  Always use your most recent med list.  
  
  
  
  
 b complex vitamins tablet Take 1 Tab by mouth daily. ergocalciferol 50,000 unit capsule Commonly known as:  ERGOCALCIFEROL Take 1 Cap by mouth every seven (7) days. escitalopram oxalate 20 mg tablet Commonly known as:  [place] TAKE 1 TABLET EVERY DAY  
  
 ferrous sulfate 325 mg (65 mg iron) tablet  
  
 multivitamin tablet Commonly known as:  ONE A DAY Take 1 Tab by mouth daily. RELPAX 40 mg tablet Generic drug:  eletriptan Take 1 Tab by mouth daily as needed. traZODone 50 mg tablet Commonly known as:  DESYREL  
TAKE 1 TABLET BY MOUTH NIGHTLY. To-Do List   
 01/24/2018 11:00 AM  
(Arrive by 10:45 AM) Appointment with Gerson WELSH 1 at [address] [place] [address] ([phone]) Shower or bathe using soap and water. Do not use deodorant, powder, perfumes, or lotion the day of your exam.  If your prior mammograms were not performed at [facility] 6 please bring films with you or forward prior images 2 days before your procedure. Check in at registration 15min before your appointment time unless you were instructed to do otherwise. A script is not necessary, but if you have one, please bring it on the day of the mammogram or have it faxed to the department. [facility] [phone] [facility] [place]  [phone] [facility] 19 [facility]  [phone] [facility] [phone] [address] [phone] Please arrive 15 minutes prior to appointment to register. Introducing [facility] & HEALTH SERVICES! Heidy Harding introduces [employer] patient portal. Now you can access parts of your medical record, email your doctor's office, and request medication refills online. 1. In your internet browser, go to [email or web address] 2. Click on the First Time User? Click Here link in the Sign In box. You will see the New Member Sign Up page. 3. Enter your [employer] Access Code exactly as it appears below. You will not need to use this code after youve completed the sign-up process.  If you do not sign up before the expiration date, you must request a new code. · DanceJam Access Code: [email or web address] Expires: 3/11/2018  4:11 PM 
 
4. Enter the last four digits of your Social Security Number (xxxx) and Date of Birth (mm/dd/yyyy) as indicated and click Submit. You will be taken to the next sign-up page. 5. Create a DanceJam ID. This will be your DanceJam login ID and cannot be changed, so think of one that is secure and easy to remember. 6. Create a DanceJam password. You can change your password at any time. 7. Enter your Password Reset Question and Answer. This can be used at a later time if you forget your password. 8. Enter your e-mail address. You will receive e-mail notification when new information is available in [address] 9. Click Sign Up. You can now view and download portions of your medical record. 10. Click the Download Summary menu link to download a portable copy of your medical information. If you have questions, please visit the Frequently Asked Questions section of the DanceJam website. Remember, [employer]Jam is NOT to be used for urgent needs. For medical emergencies, dial 911. Now available from your iPhone and Android! Please provide this summary of care documentation to your next provider. Your primary care clinician is listed as [address]. If you have any questions after today's visit, please call [phone].

## 2017-12-14 NOTE — PROGRESS NOTES
[place]: [place]  [phone]    History of Present Illness:   Ms. Francisca Calle is a 44 yo F with obesity, borderline mixed hyperlipidemia, referred by Dr. Aspen Hernandez for cardiac evaluation. She is here due to recent chest pain and shortness of breath. She has noticed it over the last three to four months when she is going up a hill or exerting herself heavily, she will get more easily short of breath and have sometimes a \"tightness\" left sided. When she rests for a few minutes, this seems to be relieved. She sometimes gets a sharp sensation in the left side of her chest, though that is usually very brief. She denies any prior cardiac history. She is compensated on exam with clear lungs and her EKG from 12/11/2017 I reviewed personally and this was normal sinus rhythm, heart rate of 73 and nonspecific ST-T wave abnormality. Soc hx. , no tobacco.  Fam hx. Adopted  Assessment and Plan:   1. Unstable angina. Chest pain with typical and atypical features. Will proceed with a treadmill stress echocardiogram for further evaluation. If this is unrevealing, would consider musculoskeletal or GI etiology. With her shortness of breath, could be pulmonary in etiology. 2. Borderline mixed hyperlipidemia. 3. Obesity.        She  has a past medical history of Anxiety; Apnea; Depression; and Migraines. She also has no past medical history of Abuse; Anemia NEC; Arrhythmia; Arthritis; Asthma; Autoimmune disease ([address] CAD (coronary artery disease); Calculus of kidney; Cancer ([address] Chronic kidney disease; Chronic pain; Congestive heart failure, unspecified; Contact dermatitis and other eczema, due to unspecified cause; COPD; Diabetes ([address] GERD (gastroesophageal reflux disease); Hypercholesterolemia; Hypertension; Liver disease; Psychotic disorder; PUD (peptic ulcer disease); Seizures ([address] Stroke [facility]); Thromboembolus ([address] Thyroid disease; or Trauma.       All other systems negative except as above.     PE  There were no vitals filed for this visit. There is no height or weight on file to calculate BMI. General appearance - alert, well appearing, and in no distress  Mental status - affect appropriate to mood  Eyes - sclera anicteric, moist mucous membranes  Neck - supple, no JVD  Chest - clear to auscultation, no wheezes, rales or rhonchi  Heart - normal rate, regular rhythm, normal S1, S2, no murmurs, rubs, clicks or gallops  Abdomen - soft, nontender, nondistended, no masses or organomegaly  Neurological - no focal deficit  Extremities - peripheral pulses normal, trace pedal edema      Recent Labs:  Lab Results   Component Value Date/Time    Cholesterol, total 256 01/13/2017 11:44 AM    HDL Cholesterol 52 01/13/2017 11:44 AM    LDL, calculated 174 01/13/2017 11:44 AM    Triglyceride 151 01/13/2017 11:44 AM     Lab Results   Component Value Date/Time    Creatinine 0.96 01/13/2017 11:44 AM     Lab Results   Component Value Date/Time    BUN 13 01/13/2017 11:44 AM     Lab Results   Component Value Date/Time    Potassium 4.7 01/13/2017 11:44 AM     Lab Results   Component Value Date/Time    Hemoglobin A1c 5.3 05/10/2014 08:40 AM     Lab Results   Component Value Date/Time    HGB 11.5 01/13/2017 11:44 AM     Lab Results   Component Value Date/Time    PLATELET 138 75/26/2825 11:44 AM       Reviewed:  Past Medical History:   Diagnosis Date    Anxiety     Apnea     mild    Depression     Migraines      History   Smoking Status    Never Smoker   Smokeless Tobacco    Never Used     History   Alcohol Use    0.6 oz/week    0 Standard drinks or equivalent, 1 Glasses of wine per week     Comment: rare     Allergies   Allergen Reactions    Hay Fever And Allergy Relief Itching     Rash, sneezing    Pcn [Penicillins] Rash       Current Outpatient Prescriptions   Medication Sig    RELPAX 40 mg tablet Take 1 Tab by mouth daily as needed.     escitalopram oxalate (LEXAPRO) 20 mg tablet TAKE 1 TABLET EVERY DAY  traZODone (DESYREL) 50 mg tablet TAKE 1 TABLET BY MOUTH NIGHTLY.  ergocalciferol (ERGOCALCIFEROL) 50,000 unit capsule Take 1 Cap by mouth every seven (7) days.  multivitamin (ONE A DAY) tablet Take 1 Tab by mouth daily.  b complex vitamins tablet Take 1 Tab by mouth daily.  ferrous sulfate 325 mg (65 mg iron) tablet      No current facility-administered medications for this visit.         Rc Echols MD  [address] [facility] [place]  [address] [facility] [address]  [facility], [address]

## 2017-12-27 ENCOUNTER — TELEPHONE (OUTPATIENT)
Dept: CARDIOLOGY CLINIC | Age: 42
End: 2017-12-27

## 2017-12-27 ENCOUNTER — CLINICAL SUPPORT (OUTPATIENT)
Dept: CARDIOLOGY CLINIC | Age: 42
End: 2017-12-27

## 2017-12-27 DIAGNOSIS — I20.0 UNSTABLE ANGINA (HCC): ICD-10-CM

## 2017-12-27 DIAGNOSIS — E66.01 MORBID OBESITY DUE TO EXCESS CALORIES (HCC): Primary | ICD-10-CM

## 2017-12-27 DIAGNOSIS — E78.5 BORDERLINE HYPERLIPIDEMIA: ICD-10-CM

## 2017-12-27 NOTE — TELEPHONE ENCOUNTER
Called patient, verified identity.  Informed patient of the following stress test results per Dr. Leesa Tubbs.

## 2017-12-27 NOTE — TELEPHONE ENCOUNTER
----- Message from Faheem Gonzalez MD sent at 12/27/2017  2:12 PM EST -----  Please let pt know stress test was normal. thx  ----- Message -----     From: Negrito Card Result In     Sent: 12/27/2017   2:08 PM       To: Faheem Gonzalez MD

## 2017-12-27 NOTE — PROGRESS NOTES
ID verified per protocol. Per Dr. Marco Ramos, utilized definity. Test explained, risk factors reviewed, pt verbalized understanding. Consent signed. Saline lock #22 gauge in Left ACF. 1 stick(s). Good blood return, flushed without difficulty. At 11:45, definity 1.5mL in 8.5mL NS administered via IV Push. Total 2.5mL given. No complaints voiced. Saline lock removed, pressure applied until homeostasis achieved. Dressing applied. Pt instructed to leave on for 30 min. Pt left office without complaints.

## 2017-12-28 ENCOUNTER — OFFICE VISIT (OUTPATIENT)
Dept: INTERNAL MEDICINE CLINIC | Age: 42
End: 2017-12-28

## 2017-12-28 VITALS — WEIGHT: 239 LBS | BODY MASS INDEX: 42.14 KG/M2

## 2017-12-28 VITALS
OXYGEN SATURATION: 98 % | DIASTOLIC BLOOD PRESSURE: 83 MMHG | BODY MASS INDEX: 43.02 KG/M2 | TEMPERATURE: 98.2 F | SYSTOLIC BLOOD PRESSURE: 136 MMHG | HEIGHT: 63 IN | WEIGHT: 242.8 LBS | HEART RATE: 91 BPM | RESPIRATION RATE: 14 BRPM

## 2017-12-28 DIAGNOSIS — J20.8 ACUTE BRONCHITIS DUE TO OTHER SPECIFIED ORGANISMS: Primary | ICD-10-CM

## 2017-12-28 PROBLEM — F33.9 RECURRENT DEPRESSION (HCC): Status: ACTIVE | Noted: 2017-12-28

## 2017-12-28 RX ORDER — PREDNISONE 10 MG/1
20 TABLET ORAL SEE ADMIN INSTRUCTIONS
Qty: 21 TAB | Refills: 0 | Status: SHIPPED | OUTPATIENT
Start: 2017-12-28 | End: 2018-01-24

## 2017-12-28 RX ORDER — ALBUTEROL SULFATE 90 UG/1
AEROSOL, METERED RESPIRATORY (INHALATION)
Qty: 1 INHALER | Refills: 0 | Status: SHIPPED | OUTPATIENT
Start: 2017-12-28 | End: 2019-06-05

## 2017-12-28 RX ORDER — AZITHROMYCIN 250 MG/1
TABLET, FILM COATED ORAL
Qty: 6 TAB | Refills: 0 | Status: SHIPPED | OUTPATIENT
Start: 2017-12-28 | End: 2018-01-24

## 2017-12-28 NOTE — PATIENT INSTRUCTIONS
It was a pleasure to see you! As discussed:    Bronchitis-Bacterial  You have bronchitis I have prescribed antibiotics use as prescribed. Start the prednisone if no improvement in 48hrs. Use albuterol inhaler 2-4 puffs every 4-6 hours for 48 hours then every 4-6 hours as needed. For your  Symptoms:  -Sore throat: Cepacol or similar lozenges, Salt water gargles  -Congestion/ Mucus Nonsedating antihistamine such as Allegra, Zyrtec, or Claritin (generic is fine)  -Pain/ Aches: You can use Ibuprofen (no more than 2400 mg/day) or Aleve (no more than 880mg/ day) as needed. Do not use any other NSAIDs while on this medication. Do not use NSAIDs if you have high blood pressure or history of ulcers or abnormal bleeding. OR   -Take Tylenol as needed for headache and body aches (every 4-8 hours, do not use more than 3000mg in one day)       Bronchitis: Care Instructions  Your Care Instructions    Bronchitis is inflammation of the bronchial tubes, which carry air to the lungs. The tubes swell and produce mucus, or phlegm. The mucus and inflamed bronchial tubes make you cough. You may have trouble breathing. Most cases of bronchitis are caused by viruses like those that cause colds. Antibiotics usually do not help and they may be harmful. Bronchitis usually develops rapidly and lasts about 2 to 3 weeks in otherwise healthy people. Follow-up care is a key part of your treatment and safety. Be sure to make and go to all appointments, and call your doctor if you are having problems. It's also a good idea to know your test results and keep a list of the medicines you take. How can you care for yourself at home? · Take all medicines exactly as prescribed. Call your doctor if you think you are having a problem with your medicine. · Get some extra rest.  · Take an over-the-counter pain medicine, such as acetaminophen (Tylenol), ibuprofen (Advil, Motrin), or naproxen (Aleve) to reduce fever and relieve body aches.  Read and follow all instructions on the label. · Do not take two or more pain medicines at the same time unless the doctor told you to. Many pain medicines have acetaminophen, which is Tylenol. Too much acetaminophen (Tylenol) can be harmful. · Take an over-the-counter cough medicine that contains dextromethorphan to help quiet a dry, hacking cough so that you can sleep. Avoid cough medicines that have more than one active ingredient. Read and follow all instructions on the label. · Breathe moist air from a humidifier, hot shower, or sink filled with hot water. The heat and moisture will thin mucus so you can cough it out. · Do not smoke. Smoking can make bronchitis worse. If you need help quitting, talk to your doctor about stop-smoking programs and medicines. These can increase your chances of quitting for good. When should you call for help? Call 911 anytime you think you may need emergency care. For example, call if:  ? · You have severe trouble breathing. ?Call your doctor now or seek immediate medical care if:  ? · You have new or worse trouble breathing. ? · You cough up dark brown or bloody mucus (sputum). ? · You have a new or higher fever. ? · You have a new rash. ? Watch closely for changes in your health, and be sure to contact your doctor if:  ? · You cough more deeply or more often, especially if you notice more mucus or a change in the color of your mucus. ? · You are not getting better as expected. Where can you learn more? Go to http://rupert-louie.info/. Enter H333 in the search box to learn more about \"Bronchitis: Care Instructions. \"  Current as of: May 12, 2017  Content Version: 11.4  © 5920-2144 [employer]. Care instructions adapted under license by [employer] (which disclaims liability or warranty for this information).  If you have questions about a medical condition or this instruction, always ask your healthcare professional. [employer] disclaims any warranty or liability for your use of this information.

## 2017-12-28 NOTE — PROGRESS NOTES
HISTORY OF PRESENT ILLNESS  Anjali Handley is a 43 y.o. female. Cold Symptoms   The current episode started more than 1 week ago. The problem has not changed since onset. The cough is productive of purulent sputum. There has been no fever. Associated symptoms include headaches, rhinorrhea and shortness of breath (worsened from baseline ). Pertinent negatives include no chest pain (pleuritic ), no chills, no sweats, no eye redness, no ear congestion, no ear pain, no sore throat, no myalgias, no wheezing, no nausea and no vomiting. Treatments tried: [facility] mucinex  The treatment provided mild relief. Her past medical history is significant for bronchitis. Her past medical history does not include asthma. Review of Systems   Constitutional: Negative for chills. HENT: Positive for rhinorrhea. Negative for ear pain and sore throat. Eyes: Negative for redness. Respiratory: Positive for cough and shortness of breath (worsened from baseline ). Negative for wheezing. Cardiovascular: Negative for chest pain (pleuritic ). Gastrointestinal: Negative for nausea and vomiting. Musculoskeletal: Negative for myalgias. Neurological: Positive for headaches. Patient Active Problem List    Diagnosis Date Noted    Recurrent depression ([facility] [address] 12/28/2017    Adopted person 12/11/2017    Iron deficiency anemia due to chronic blood loss 06/23/2016    Vitamin D insufficiency 06/23/2016    Abnormal findings on esophagogastroduodenoscopy (EGD) 06/23/2016    Infected lesion in nose 10/04/2015    Hypercalcemia 10/04/2015    MAGGY (generalized anxiety disorder) 09/30/2015    Hypovitaminosis D 01/26/2015    Morbid obesity due to excess calories (HCC) 04/28/2014    Mild sleep apnea 04/28/2014    Migraines        Current Outpatient Prescriptions   Medication Sig Dispense Refill    RELPAX 40 mg tablet Take 1 Tab by mouth daily as needed.  8 Tab 0    escitalopram oxalate (LEXAPRO) 20 mg tablet TAKE 1 TABLET EVERY DAY 30 Tab 5    multivitamin (ONE A DAY) tablet Take 1 Tab by mouth daily.  ferrous sulfate 325 mg (65 mg iron) tablet   3    traZODone (DESYREL) 50 mg tablet TAKE 1 TABLET BY MOUTH NIGHTLY. 30 Tab 0    ergocalciferol (ERGOCALCIFEROL) 50,000 unit capsule Take 1 Cap by mouth every seven (7) days. 12 Cap 0    b complex vitamins tablet Take 1 Tab by mouth daily. Allergies   Allergen Reactions    Hay Fever And Allergy Relief Itching     Rash, sneezing    Pcn [Penicillins] Rash      Visit Vitals    /83 (BP 1 Location: Right arm, BP Patient Position: Sitting)    Pulse 91    Temp 98.2 °F (36.8 °C) (Oral)    Resp 14    Ht 5' 3.15\" (1.604 m)    Wt 242 lb 12.8 oz (110.1 kg)    SpO2 98%    BMI 42.81 kg/m2       Physical Exam   Constitutional: She is oriented to person, place, and time. She appears well-developed. No distress. HENT:   Right Ear: Tympanic membrane is retracted. Tympanic membrane is not injected. Left Ear: Tympanic membrane is retracted. Tympanic membrane is not erythematous. A middle ear effusion is present. Nose: Mucosal edema present. No rhinorrhea or septal deviation. Right sinus exhibits no maxillary sinus tenderness and no frontal sinus tenderness. Left sinus exhibits no maxillary sinus tenderness and no frontal sinus tenderness. Mouth/Throat: No oropharyngeal exudate. Eyes: Conjunctivae are normal.   Neck: Neck supple. Cardiovascular: Normal rate, regular rhythm and normal heart sounds. Pulmonary/Chest: Effort normal. No respiratory distress. She has no wheezes. She has no rales. She exhibits no tenderness. Coarse BS   Paroxysmal coughing    Lymphadenopathy:     She has no cervical adenopathy. Neurological: She is alert and oriented to person, place, and time. ASSESSMENT and PLAN  Diagnoses and all orders for this visit:    1. Acute bronchitis due to other specified organisms- bacterial based on s/s.  Previous episodes have required steroids she has been advised to try abx and albuterol ATC x 48hrs if no improvement add prednisone. Red flags to warrant ER or earlier clinical evaluation reviewed. See AVS for full details of plan and patient discussion. QTc on recent EKG wnl- okay to use Zpack. -     predniSONE (STERAPRED DS) 10 mg dose pack; Take 2 Tabs by mouth See Admin Instructions. See administration instruction per 10mg dose pack  -     albuterol (PROVENTIL HFA, VENTOLIN HFA, PROAIR HFA) 90 mcg/actuation inhaler; Use albuterol inhaler 2-4 puffs every 4-6 hours for 48 hours then every 4-6 hours as needed. -     azithromycin (ZITHROMAX) 250 mg tablet; Day 1 take 2 tabs by mouth; Days 2-5 take one tab by mouth a day      Follow-up Disposition:  Return if symptoms worsen or fail to improve. Medication risks/benefits/costs/interactions/alternatives discussed with patient. Oswaldo King  was given an after visit summary which includes diagnoses, current medications, & vitals. she expressed understanding with the diagnosis and plan.

## 2017-12-28 NOTE — MR AVS SNAPSHOT
Visit Information Date & Time Provider Department Dept. Phone Encounter #  
 12/28/2017  3:45 PM Dominik Elmore MD [address] Internal Medicine [phone] [id number] Follow-up Instructions Return if symptoms worsen or fail to improve. Your Appointments 1/24/2018  8:45 AM  
ROUTINE CARE with Dominik Elmore MD  
[address] Internal Medicine [facility] Appt Note: f/u 6 wks [address] [facility] [address] [facility] [address]  
[phone] [address] Upcoming Health Maintenance Date Due  
 PAP AKA CERVICAL CYTOLOGY 11/13/2015 DTaP/Tdap/Td series (2 - Td) 4/28/2024 Allergies as of 12/28/2017  Review Complete On: 12/28/2017 By: Jose Miguel Francis LPN Severity Noted Reaction Type Reactions Hay Fever And Allergy Relief  12/11/2017    Itching Rash, sneezing Pcn [Penicillins]  05/13/2011    Rash Current Immunizations  Reviewed on 1/12/2017 Name Date Influenza Vaccine 12/1/2014, 10/1/2013 Influenza Vaccine Split 11/13/2012 Tdap 4/28/2014 Not reviewed this visit You Were Diagnosed With   
  
 Codes Comments Acute bronchitis due to other specified organisms    -  Primary ICD-10-CM: J20.8 ICD-9-CM: 466.0 Vitals BP Pulse Temp Resp Height(growth percentile) Weight(growth percentile) 136/83 (BP 1 Location: Right arm, BP Patient Position: Sitting) 91 98.2 °F (36.8 °C) (Oral) 14 5' 3.15\" (1.604 m) 242 lb 12.8 oz (110.1 kg) LMP SpO2 BMI OB Status Smoking Status 12/23/2017 98% 42.81 kg/m2 Having regular periods Never Smoker Vitals History BMI and BSA Data Body Mass Index Body Surface Area  
 42.81 kg/m 2 2.21 m 2 Preferred Pharmacy Pharmacy Name Phone [id number] [place] [address] [phone] Your Updated Medication List  
  
   
 This list is accurate as of: 12/28/17  4:08 PM.  Always use your most recent med list.  
  
  
  
  
 albuterol 90 mcg/actuation inhaler Commonly known as:  PROVENTIL HFA, VENTOLIN HFA, PROAIR HFA Use albuterol inhaler 2-4 puffs every 4-6 hours for 48 hours then every 4-6 hours as needed. azithromycin 250 mg tablet Commonly known as:  Mikael Blythe Day 1 take 2 tabs by mouth; Days 2-5 take one tab by mouth a day  
  
 b complex vitamins tablet Take 1 Tab by mouth daily. ergocalciferol 50,000 unit capsule Commonly known as:  ERGOCALCIFEROL Take 1 Cap by mouth every seven (7) days. escitalopram oxalate 20 mg tablet Commonly known as:  Meghan Jamesz TAKE 1 TABLET EVERY DAY  
  
 ferrous sulfate 325 mg (65 mg iron) tablet  
  
 multivitamin tablet Commonly known as:  ONE A DAY Take 1 Tab by mouth daily. predniSONE 10 mg dose pack Commonly known as:  STERAPRED DS Take 2 Tabs by mouth See Admin Instructions. See administration instruction per 10mg dose pack RELPAX 40 mg tablet Generic drug:  eletriptan Take 1 Tab by mouth daily as needed. traZODone 50 mg tablet Commonly known as:  DESYREL  
TAKE 1 TABLET BY MOUTH NIGHTLY. Prescriptions Sent to Pharmacy Refills  
 predniSONE (STERAPRED DS) 10 mg dose pack 0 Sig: Take 2 Tabs by mouth See Admin Instructions. See administration instruction per 10mg dose pack Class: Normal  
 Pharmacy: [facility][id number]- [place] [address] [employer] Ph #: [phone] Route: Oral  
 albuterol (PROVENTIL HFA, VENTOLIN HFA, PROAIR HFA) 90 mcg/actuation inhaler 0 Sig: Use albuterol inhaler 2-4 puffs every 4-6 hours for 48 hours then every 4-6 hours as needed.   
 Class: Normal  
 Pharmacy: [facility] #[id number]- [place] [address] [employer] Ph #: [phone]  
 azithromycin (ZITHROMAX) 250 mg tablet 0  
 Sig: Day 1 take 2 tabs by mouth; Days 2-5 take one tab by mouth a day Class: Normal  
 Pharmacy: [id number] [place], [address] [place] [address] #: [phone] Follow-up Instructions Return if symptoms worsen or fail to improve. To-Do List   
 01/24/2018 11:00 AM  
(Arrive by 10:45 AM) Appointment with Wai WELSH 1 at [address] [place] [address] ([phone]) Shower or bathe using soap and water. Do not use deodorant, powder, perfumes, or lotion the day of your exam.  If your prior mammograms were not performed at [facility] 6 please bring films with you or forward prior images 2 days before your procedure. Check in at registration 15min before your appointment time unless you were instructed to do otherwise. A script is not necessary, but if you have one, please bring it on the day of the mammogram or have it faxed to the department. [facility] [phone] [facility]  [phone] [facility]  [phone] [facility] [phone] [address] [phone] Please arrive 15 minutes prior to appointment to register. Patient Instructions It was a pleasure to see you! As discussed: 
 
Bronchitis-Bacterial 
You have bronchitis I have prescribed antibiotics use as prescribed. Start the prednisone if no improvement in 48hrs. Use albuterol inhaler 2-4 puffs every 4-6 hours for 48 hours then every 4-6 hours as needed. For your  Symptoms: 
-Sore throat: Cepacol or similar lozenges, Salt water gargles 
-Congestion/ Mucus Nonsedating antihistamine such as Allegra, Zyrtec, or Claritin (generic is fine) 
-Pain/ Aches: You can use Ibuprofen (no more than 2400 mg/day) or Aleve (no more than 880mg/ day) as needed. Do not use any other NSAIDs while on this medication. Do not use NSAIDs if you have high blood pressure or history of ulcers or abnormal bleeding.  
OR  
 -Take Tylenol as needed for headache and body aches (every 4-8 hours, do not use more than 3000mg in one day) Bronchitis: Care Instructions Your Care Instructions Bronchitis is inflammation of the bronchial tubes, which carry air to the lungs. The tubes swell and produce mucus, or phlegm. The mucus and inflamed bronchial tubes make you cough. You may have trouble breathing. Most cases of bronchitis are caused by viruses like those that cause colds. Antibiotics usually do not help and they may be harmful. Bronchitis usually develops rapidly and lasts about 2 to 3 weeks in otherwise healthy people. Follow-up care is a key part of your treatment and safety. Be sure to make and go to all appointments, and call your doctor if you are having problems. It's also a good idea to know your test results and keep a list of the medicines you take. How can you care for yourself at home? · Take all medicines exactly as prescribed. Call your doctor if you think you are having a problem with your medicine. · Get some extra rest. 
· Take an over-the-counter pain medicine, such as acetaminophen (Tylenol), ibuprofen (Advil, Motrin), or naproxen (Aleve) to reduce fever and relieve body aches. Read and follow all instructions on the label. · Do not take two or more pain medicines at the same time unless the doctor told you to. Many pain medicines have acetaminophen, which is Tylenol. Too much acetaminophen (Tylenol) can be harmful. · Take an over-the-counter cough medicine that contains dextromethorphan to help quiet a dry, hacking cough so that you can sleep. Avoid cough medicines that have more than one active ingredient. Read and follow all instructions on the label. · Breathe moist air from a humidifier, hot shower, or sink filled with hot water. The heat and moisture will thin mucus so you can cough it out. · Do not smoke. Smoking can make bronchitis worse.  If you need help quitting, talk to your doctor about stop-smoking programs and medicines. These can increase your chances of quitting for good. When should you call for help? Call 911 anytime you think you may need emergency care. For example, call if: 
? · You have severe trouble breathing. ?Call your doctor now or seek immediate medical care if: 
? · You have new or worse trouble breathing. ? · You cough up dark brown or bloody mucus (sputum). ? · You have a new or higher fever. ? · You have a new rash. ? Watch closely for changes in your health, and be sure to contact your doctor if: 
? · You cough more deeply or more often, especially if you notice more mucus or a change in the color of your mucus. ? · You are not getting better as expected. Where can you learn more? Go to http://rupert-louie.info/. Enter H333 in the search box to learn more about \"Bronchitis: Care Instructions. \" Current as of: May 12, 2017 Content Version: 11.4 © 3894-6409 [employer]. Care instructions adapted under license by [employer] (which disclaims liability or warranty for this information). If you have questions about a medical condition or this instruction, always ask your healthcare professional. [place] [address] any warranty or liability for your use of this information. Introducing [facility]! Heidy Harding introduces coComment patient portal. Now you can access parts of your medical record, email your doctor's office, and request medication refills online. 1. In your internet browser, go to [email or web address] 2. Click on the First Time User? Click Here link in the Sign In box. You will see the New Member Sign Up page. 3. Enter your coComment Access Code exactly as it appears below. You will not need to use this code after youve completed the sign-up process.  If you do not sign up before the expiration date, you must request a new code. 
 
· [employer] Access Code: [email or web address] Expires: 3/11/2018  4:11 PM 
 
4. Enter the last four digits of your Social Security Number (xxxx) and Date of Birth (mm/dd/yyyy) as indicated and click Submit. You will be taken to the next sign-up page. 5. Create a [employer] ID. This will be your [employer] login ID and cannot be changed, so think of one that is secure and easy to remember. 6. Create a [employer] password. You can change your password at any time. 7. Enter your Password Reset Question and Answer. This can be used at a later time if you forget your password. 8. Enter your e-mail address. You will receive e-mail notification when new information is available in [address] 9. Click Sign Up. You can now view and download portions of your medical record. 10. Click the Download Summary menu link to download a portable copy of your medical information. If you have questions, please visit the Frequently Asked Questions section of the [employer] website. Remember, [employer] is NOT to be used for urgent needs. For medical emergencies, dial 911. Now available from your iPhone and Android! Please provide this summary of care documentation to your next provider. Your primary care clinician is listed as [place] [facility]. If you have any questions after today's visit, please call [phone].

## 2017-12-28 NOTE — PROGRESS NOTES
Chief Complaint   Patient presents with    Cough     1. Have you been to the ER, urgent care clinic since your last visit? Hospitalized since your last visit? Yes When: 12/22/2017 [facility] Reason for visit: Cough    2. Have you seen or consulted any other health care providers outside of the [address] [place] [address] since your last visit? Include any pap smears or colon screening. No Stress Test, Dr Garcia/results normal    Patient states cough for 2 weeks-yellow mucus. Taking mucinex.  Post nasal drip

## 2017-12-28 NOTE — PROGRESS NOTES
Results reported out in Stress test encounter. Charge for Definity is included in this echo encounter.

## 2018-01-13 LAB
25(OH)D3+25(OH)D2 SERPL-MCNC: 22.9 NG/ML (ref 30–100)
ALBUMIN SERPL-MCNC: 3.9 G/DL (ref 3.5–5.5)
ALBUMIN/GLOB SERPL: 1.6 {RATIO} (ref 1.2–2.2)
ALP SERPL-CCNC: 110 IU/L (ref 39–117)
ALT SERPL-CCNC: 23 IU/L (ref 0–32)
AST SERPL-CCNC: 21 IU/L (ref 0–40)
BASOPHILS # BLD AUTO: 0 X10E3/UL (ref 0–0.2)
BASOPHILS NFR BLD AUTO: 0 %
BILIRUB SERPL-MCNC: 0.3 MG/DL (ref 0–1.2)
BUN SERPL-MCNC: 10 MG/DL (ref 6–24)
BUN/CREAT SERPL: 12 (ref 9–23)
CALCIUM SERPL-MCNC: 10.4 MG/DL (ref 8.7–10.2)
CHLORIDE SERPL-SCNC: 107 MMOL/L (ref 96–106)
CHOLEST SERPL-MCNC: 200 MG/DL (ref 100–199)
CO2 SERPL-SCNC: 22 MMOL/L (ref 18–29)
CREAT SERPL-MCNC: 0.85 MG/DL (ref 0.57–1)
EOSINOPHIL # BLD AUTO: 0.1 X10E3/UL (ref 0–0.4)
EOSINOPHIL NFR BLD AUTO: 1 %
ERYTHROCYTE [DISTWIDTH] IN BLOOD BY AUTOMATED COUNT: 17.9 % (ref 12.3–15.4)
FT4I SERPL CALC-MCNC: 1.8 (ref 1.2–4.9)
GLOBULIN SER CALC-MCNC: 2.4 G/DL (ref 1.5–4.5)
GLUCOSE SERPL-MCNC: 96 MG/DL (ref 65–99)
HCT VFR BLD AUTO: 33.3 % (ref 34–46.6)
HDLC SERPL-MCNC: 47 MG/DL
HGB BLD-MCNC: 10.3 G/DL (ref 11.1–15.9)
IMM GRANULOCYTES # BLD: 0 X10E3/UL (ref 0–0.1)
IMM GRANULOCYTES NFR BLD: 0 %
LDLC SERPL CALC-MCNC: 124 MG/DL (ref 0–99)
LYMPHOCYTES # BLD AUTO: 2.5 X10E3/UL (ref 0.7–3.1)
LYMPHOCYTES NFR BLD AUTO: 34 %
MCH RBC QN AUTO: 22.5 PG (ref 26.6–33)
MCHC RBC AUTO-ENTMCNC: 30.9 G/DL (ref 31.5–35.7)
MCV RBC AUTO: 73 FL (ref 79–97)
MONOCYTES # BLD AUTO: 0.5 X10E3/UL (ref 0.1–0.9)
MONOCYTES NFR BLD AUTO: 7 %
NEUTROPHILS # BLD AUTO: 4.2 X10E3/UL (ref 1.4–7)
NEUTROPHILS NFR BLD AUTO: 58 %
PLATELET # BLD AUTO: 276 X10E3/UL (ref 150–379)
POTASSIUM SERPL-SCNC: 4.4 MMOL/L (ref 3.5–5.2)
PROT SERPL-MCNC: 6.3 G/DL (ref 6–8.5)
RBC # BLD AUTO: 4.57 X10E6/UL (ref 3.77–5.28)
SODIUM SERPL-SCNC: 140 MMOL/L (ref 134–144)
T3RU NFR SERPL: 26 % (ref 24–39)
T4 SERPL-MCNC: 6.8 UG/DL (ref 4.5–12)
TRIGL SERPL-MCNC: 143 MG/DL (ref 0–149)
TSH SERPL DL<=0.005 MIU/L-ACNC: 1.74 UIU/ML (ref 0.45–4.5)
VLDLC SERPL CALC-MCNC: 29 MG/DL (ref 5–40)
WBC # BLD AUTO: 7.2 X10E3/UL (ref 3.4–10.8)

## 2018-01-24 ENCOUNTER — OFFICE VISIT (OUTPATIENT)
Dept: INTERNAL MEDICINE CLINIC | Age: 43
End: 2018-01-24

## 2018-01-24 ENCOUNTER — HOSPITAL ENCOUNTER (OUTPATIENT)
Dept: GENERAL RADIOLOGY | Age: 43
Discharge: HOME | End: 2018-01-24
Attending: INTERNAL MEDICINE
Payer: COMMERCIAL

## 2018-01-24 ENCOUNTER — HOSPITAL ENCOUNTER (OUTPATIENT)
Dept: MAMMOGRAPHY | Age: 43
Discharge: HOME | End: 2018-01-24
Attending: INTERNAL MEDICINE
Payer: COMMERCIAL

## 2018-01-24 VITALS
BODY MASS INDEX: 42.7 KG/M2 | OXYGEN SATURATION: 96 % | RESPIRATION RATE: 16 BRPM | DIASTOLIC BLOOD PRESSURE: 90 MMHG | TEMPERATURE: 98.5 F | HEART RATE: 102 BPM | WEIGHT: 241 LBS | SYSTOLIC BLOOD PRESSURE: 140 MMHG | HEIGHT: 63 IN

## 2018-01-24 DIAGNOSIS — Z12.39 BREAST SCREENING: ICD-10-CM

## 2018-01-24 DIAGNOSIS — R06.02 SHORTNESS OF BREATH: ICD-10-CM

## 2018-01-24 DIAGNOSIS — D50.0 IRON DEFICIENCY ANEMIA DUE TO CHRONIC BLOOD LOSS: ICD-10-CM

## 2018-01-24 DIAGNOSIS — R06.02 SHORTNESS OF BREATH: Primary | ICD-10-CM

## 2018-01-24 DIAGNOSIS — E66.01 OBESITY, MORBID, BMI 40.0-49.9 (HCC): ICD-10-CM

## 2018-01-24 PROCEDURE — 71046 X-RAY EXAM CHEST 2 VIEWS: CPT

## 2018-01-24 PROCEDURE — 77067 SCR MAMMO BI INCL CAD: CPT

## 2018-01-24 NOTE — PATIENT INSTRUCTIONS
It was a pleasure to see you! As discussed:    Complete the exercise program ordered. Shortness of Breath: Care Instructions  Your Care Instructions  Shortness of breath has many causes. Sometimes conditions such as anxiety can lead to shortness of breath. Some people get mild shortness of breath when they exercise. Trouble breathing also can be a symptom of a serious problem, such as asthma, lung disease, emphysema, heart problems, and pneumonia. If your shortness of breath continues, you may need tests and treatment. Watch for any changes in your breathing and other symptoms. Follow-up care is a key part of your treatment and safety. Be sure to make and go to all appointments, and call your doctor if you are having problems. It's also a good idea to know your test results and keep a list of the medicines you take. How can you care for yourself at home? · Do not smoke or allow others to smoke around you. If you need help quitting, talk to your doctor about stop-smoking programs and medicines. These can increase your chances of quitting for good. · Get plenty of rest and sleep. · Take your medicines exactly as prescribed. Call your doctor if you think you are having a problem with your medicine. · Find healthy ways to deal with stress. ¨ Exercise daily. ¨ Get plenty of sleep. ¨ Eat regularly and well. When should you call for help? Call 911 anytime you think you may need emergency care. For example, call if:  ? · You have severe shortness of breath. ? · You have symptoms of a heart attack. These may include:  ¨ Chest pain or pressure, or a strange feeling in the chest.  ¨ Sweating. ¨ Shortness of breath. ¨ Nausea or vomiting. ¨ Pain, pressure, or a strange feeling in the back, neck, jaw, or upper belly or in one or both shoulders or arms. ¨ Lightheadedness or sudden weakness. ¨ A fast or irregular heartbeat.   After you call 911, the  may tell you to chew 1 adult-strength or 2 to 4 low-dose aspirin. Wait for an ambulance. Do not try to drive yourself. ?Call your doctor now or seek immediate medical care if:  ? · Your shortness of breath gets worse or you start to wheeze. Wheezing is a high-pitched sound when you breathe. ? · You wake up at night out of breath or have to prop your head up on several pillows to breathe. ? · You are short of breath after only light activity or while at rest.   ? Watch closely for changes in your health, and be sure to contact your doctor if:  ? · You do not get better over the next 1 to 2 days. Where can you learn more? Go to http://rupert-louie.info/. Enter S780 in the search box to learn more about \"Shortness of Breath: Care Instructions. \"  Current as of: May 12, 2017  Content Version: 11.4  © 4765-0674 [employer]. Care instructions adapted under license by [employer] (which disclaims liability or warranty for this information). If you have questions about a medical condition or this instruction, always ask your healthcare professional. [address] any warranty or liability for your use of this information.

## 2018-01-24 NOTE — PROGRESS NOTES
Chief Complaint   Patient presents with    Anxiety     patient is here for a follow up visit for labs      1. Have you been to the ER, urgent care clinic since your last visit? Hospitalized since your last visit? No     2. Have you seen or consulted any other health care providers outside of the [address] [place] [address] since your last visit? Include any pap smears or colon screening. No     Visit Vitals    /85 (BP 1 Location: Right arm, BP Patient Position: Sitting)    Pulse (!) 102    Temp 98.5 °F (36.9 °C) (Oral)    Resp 16    Ht 5' 3.15\" (1.604 m)    Wt 241 lb (109.3 kg)    SpO2 96%    BMI 42.49 kg/m2     Office Visit on 12/11/2017   Component Date Value Ref Range Status    WBC 01/12/2018 7.2  3.4 - 10.8 x10E3/uL Final    RBC 01/12/2018 4.57  3.77 - 5.28 x10E6/uL Final    HGB 01/12/2018 10.3* 11.1 - 15.9 g/dL Final    HCT 01/12/2018 33.3* 34.0 - 46.6 % Final    MCV 01/12/2018 73* 79 - 97 fL Final    MCH 01/12/2018 22.5* 26.6 - 33.0 pg Final    MCHC 01/12/2018 30.9* 31.5 - 35.7 g/dL Final    RDW 01/12/2018 17.9* 12.3 - 15.4 % Final    PLATELET 77/69/6135 778  150 - 379 x10E3/uL Final    NEUTROPHILS 01/12/2018 58  Not Estab. % Final    Lymphocytes 01/12/2018 34  Not Estab. % Final    MONOCYTES 01/12/2018 7  Not Estab. % Final    EOSINOPHILS 01/12/2018 1  Not Estab. % Final    BASOPHILS 01/12/2018 0  Not Estab. % Final    ABS. NEUTROPHILS 01/12/2018 4.2  1.4 - 7.0 x10E3/uL Final    Abs Lymphocytes 01/12/2018 2.5  0.7 - 3.1 x10E3/uL Final    ABS. MONOCYTES 01/12/2018 0.5  0.1 - 0.9 x10E3/uL Final    ABS. EOSINOPHILS 01/12/2018 0.1  0.0 - 0.4 x10E3/uL Final    ABS. BASOPHILS 01/12/2018 0.0  0.0 - 0.2 x10E3/uL Final    IMMATURE GRANULOCYTES 01/12/2018 0  Not Estab. % Final    ABS. IMM.  GRANS. 01/12/2018 0.0  0.0 - 0.1 x10E3/uL Final    Cholesterol, total 01/12/2018 200* 100 - 199 mg/dL Final    Triglyceride 01/12/2018 143  0 - 149 mg/dL Final    HDL Cholesterol 01/12/2018 47 >39 mg/dL Final    VLDL, calculated 01/12/2018 29  5 - 40 mg/dL Final    LDL, calculated 01/12/2018 124* 0 - 99 mg/dL Final    Glucose 01/12/2018 96  65 - 99 mg/dL Final    BUN 01/12/2018 10  6 - 24 mg/dL Final    Creatinine 01/12/2018 0.85  0.57 - 1.00 mg/dL Final    GFR est non-AA 01/12/2018 85  >59 mL/min/1.73 Final    GFR est AA 01/12/2018 98  >59 mL/min/1.73 Final    BUN/Creatinine ratio 01/12/2018 12  9 - 23 Final    Sodium 01/12/2018 140  134 - 144 mmol/L Final    Potassium 01/12/2018 4.4  3.5 - 5.2 mmol/L Final    Chloride 01/12/2018 107* 96 - 106 mmol/L Final    CO2 01/12/2018 22  18 - 29 mmol/L Final    Calcium 01/12/2018 10.4* 8.7 - 10.2 mg/dL Final    Protein, total 01/12/2018 6.3  6.0 - 8.5 g/dL Final    Albumin 01/12/2018 3.9  3.5 - 5.5 g/dL Final    GLOBULIN, TOTAL 01/12/2018 2.4  1.5 - 4.5 g/dL Final    A-G Ratio 01/12/2018 1.6  1.2 - 2.2 Final    Bilirubin, total 01/12/2018 0.3  0.0 - 1.2 mg/dL Final    Alk.  phosphatase 01/12/2018 110  39 - 117 IU/L Final    AST (SGOT) 01/12/2018 21  0 - 40 IU/L Final    ALT (SGPT) 01/12/2018 23  0 - 32 IU/L Final    T4, Total 01/12/2018 6.8  4.5 - 12.0 ug/dL Final    T3 Uptake 01/12/2018 26  24 - 39 % Final    Free Thyroxine Index (FTI) 01/12/2018 1.8  1.2 - 4.9 Final    TSH 01/12/2018 1.740  0.450 - 4.500 uIU/mL Final    VITAMIN D, 25-HYDROXY 01/12/2018 22.9* 30.0 - 100.0 ng/mL Final

## 2018-01-24 NOTE — LETTER
1/24/2018 9:13 AM 
 
Ms. Maribel Tyson [address] [address] Office Visit on 12/11/2017 Component Date Value Ref Range Status  WBC 01/12/2018 7.2  3.4 - 10.8 x10E3/uL Final  
 RBC 01/12/2018 4.57  3.77 - 5.28 x10E6/uL Final  
 HGB 01/12/2018 10.3* 11.1 - 15.9 g/dL Final  
 HCT 01/12/2018 33.3* 34.0 - 46.6 % Final  
 MCV 01/12/2018 73* 79 - 97 fL Final  
 MCH 01/12/2018 22.5* 26.6 - 33.0 pg Final  
 MCHC 01/12/2018 30.9* 31.5 - 35.7 g/dL Final  
 RDW 01/12/2018 17.9* 12.3 - 15.4 % Final  
 PLATELET 76/86/6414 805  150 - 379 x10E3/uL Final  
 NEUTROPHILS 01/12/2018 58  Not Estab. % Final  
 Lymphocytes 01/12/2018 34  Not Estab. % Final  
 MONOCYTES 01/12/2018 7  Not Estab. % Final  
 EOSINOPHILS 01/12/2018 1  Not Estab. % Final  
 BASOPHILS 01/12/2018 0  Not Estab. % Final  
 ABS. NEUTROPHILS 01/12/2018 4.2  1.4 - 7.0 x10E3/uL Final  
 Abs Lymphocytes 01/12/2018 2.5  0.7 - 3.1 x10E3/uL Final  
 ABS. MONOCYTES 01/12/2018 0.5  0.1 - 0.9 x10E3/uL Final  
 ABS. EOSINOPHILS 01/12/2018 0.1  0.0 - 0.4 x10E3/uL Final  
 ABS. BASOPHILS 01/12/2018 0.0  0.0 - 0.2 x10E3/uL Final  
 IMMATURE GRANULOCYTES 01/12/2018 0  Not Estab. % Final  
 ABS. IMM.  GRANS. 01/12/2018 0.0  0.0 - 0.1 x10E3/uL Final  
 Cholesterol, total 01/12/2018 200* 100 - 199 mg/dL Final  
 Triglyceride 01/12/2018 143  0 - 149 mg/dL Final  
 HDL Cholesterol 01/12/2018 47  >39 mg/dL Final  
 VLDL, calculated 01/12/2018 29  5 - 40 mg/dL Final  
 LDL, calculated 01/12/2018 124* 0 - 99 mg/dL Final  
 Glucose 01/12/2018 96  65 - 99 mg/dL Final  
 BUN 01/12/2018 10  6 - 24 mg/dL Final  
 Creatinine 01/12/2018 0.85  0.57 - 1.00 mg/dL Final  
 GFR est non-AA 01/12/2018 85  >59 mL/min/1.73 Final  
 GFR est AA 01/12/2018 98  >59 mL/min/1.73 Final  
 BUN/Creatinine ratio 01/12/2018 12  9 - 23 Final  
 Sodium 01/12/2018 140  134 - 144 mmol/L Final  
 Potassium 01/12/2018 4.4  3.5 - 5.2 mmol/L Final  
  Chloride 01/12/2018 107* 96 - 106 mmol/L Final  
 CO2 01/12/2018 22  18 - 29 mmol/L Final  
 Calcium 01/12/2018 10.4* 8.7 - 10.2 mg/dL Final  
 Protein, total 01/12/2018 6.3  6.0 - 8.5 g/dL Final  
 Albumin 01/12/2018 3.9  3.5 - 5.5 g/dL Final  
 GLOBULIN, TOTAL 01/12/2018 2.4  1.5 - 4.5 g/dL Final  
 A-G Ratio 01/12/2018 1.6  1.2 - 2.2 Final  
 Bilirubin, total 01/12/2018 0.3  0.0 - 1.2 mg/dL Final  
 Alk. phosphatase 01/12/2018 110  39 - 117 IU/L Final  
 AST (SGOT) 01/12/2018 21  0 - 40 IU/L Final  
 ALT (SGPT) 01/12/2018 23  0 - 32 IU/L Final  
 T4, Total 01/12/2018 6.8  4.5 - 12.0 ug/dL Final  
 T3 Uptake 01/12/2018 26  24 - 39 % Final  
 Free Thyroxine Index (FTI) 01/12/2018 1.8  1.2 - 4.9 Final  
 TSH 01/12/2018 1.740  0.450 - 4.500 uIU/mL Final  
 VITAMIN D, 25-HYDROXY 01/12/2018 22.9* 30.0 - 100.0 ng/mL Final  
 
 
 
 
 
Sincerely, Tacos Smith MD

## 2018-01-24 NOTE — MR AVS SNAPSHOT
[address] 
[phone] Patient: Ludger Siemens MRN: [id number] ISS:[id number] Visit Information Date & Time Provider Department Dept. Phone Encounter #  
 1/24/2018  8:45 AM Albertina Saunders MD [address] [id number] Internal Medicine [phone] [id number] Follow-up Instructions Return in about 4 months (around 5/24/2018) for Physical - 30 minute appointment. Upcoming Health Maintenance Date Due  
 PAP AKA CERVICAL CYTOLOGY 11/13/2015 DTaP/Tdap/Td series (2 - Td) 4/28/2024 Allergies as of 1/24/2018  Review Complete On: 1/24/2018 By: Albertina Saunders MD  
  
 Severity Noted Reaction Type Reactions Hay Fever And Allergy Relief  12/11/2017    Itching Rash, sneezing Pcn [Penicillins]  05/13/2011    Rash Current Immunizations  Reviewed on 1/12/2017 Name Date Influenza Vaccine 12/1/2014, 10/1/2013 Influenza Vaccine Split 11/13/2012 Tdap 4/28/2014 Not reviewed this visit You Were Diagnosed With   
  
 Codes Comments Shortness of breath    -  Primary ICD-10-CM: R06.02 
ICD-9-CM: 786.05 Iron deficiency anemia due to chronic blood loss     ICD-10-CM: D50.0 ICD-9-CM: 280.0 Obesity, morbid, BMI 40.0-49.9 (HCC)     ICD-10-CM: E66.01 
ICD-9-CM: 278.01 Vitals BP Pulse Temp Resp Height(growth percentile) Weight(growth percentile) 149/85 (BP 1 Location: Right arm, BP Patient Position: Sitting) (!) 102 98.5 °F (36.9 °C) (Oral) 16 5' 3.15\" (1.604 m) 241 lb (109.3 kg) SpO2 BMI OB Status Smoking Status 96% 42.49 kg/m2 Having regular periods Never Smoker BMI and BSA Data Body Mass Index Body Surface Area  
 42.49 kg/m 2 2.21 m 2 Preferred Pharmacy Pharmacy Name Phone [id number] [address] [phone] Your Updated Medication List  
  
   
 This list is accurate as of: 1/24/18  9:37 AM.  Always use your most recent med list.  
  
  
  
  
 albuterol 90 mcg/actuation inhaler Commonly known as:  PROVENTIL HFA, VENTOLIN HFA, PROAIR HFA Use albuterol inhaler 2-4 puffs every 4-6 hours for 48 hours then every 4-6 hours as needed. eletriptan 40 mg tablet Commonly known as:  RELPAX TAKE 1 TAB BY MOUTH DAILY AS NEEDED. escitalopram oxalate 20 mg tablet Commonly known as:  [address] TAKE 1 TABLET EVERY DAY  
  
 multivitamin tablet Commonly known as:  ONE A DAY Take 1 Tab by mouth daily. Follow-up Instructions Return in about 4 months (around 5/24/2018) for Physical - 30 minute appointment. To-Do List   
 01/24/2018 PFT:  PULMONARY FUNCTION TEST   
  
 01/24/2018 Imaging:  XR CHEST PA LAT   
  
 01/24/2018 11:00 AM  
(Arrive by 10:45 AM) Appointment with Ryne Medina at [address] [place] [address] ([phone]) Shower or bathe using soap and water. Do not use deodorant, powder, perfumes, or lotion the day of your exam.  If your prior mammograms were not performed at [facility] 6 please bring films with you or forward prior images 2 days before your procedure. Check in at registration 15min before your appointment time unless you were instructed to do otherwise. A script is not necessary, but if you have one, please bring it on the day of the mammogram or have it faxed to the department. [facility] [phone] [facility] [place]  [phone] [facility] [address]  [phone] [facility] [phone] [facility] [address] [place] [address] [phone] Please arrive 15 minutes prior to appointment to register. Patient Instructions It was a pleasure to see you! As discussed: 
 
Complete the exercise program ordered. Shortness of Breath: Care Instructions Your Care Instructions Shortness of breath has many causes.  Sometimes conditions such as anxiety can lead to shortness of breath. Some people get mild shortness of breath when they exercise. Trouble breathing also can be a symptom of a serious problem, such as asthma, lung disease, emphysema, heart problems, and pneumonia. If your shortness of breath continues, you may need tests and treatment. Watch for any changes in your breathing and other symptoms. Follow-up care is a key part of your treatment and safety. Be sure to make and go to all appointments, and call your doctor if you are having problems. It's also a good idea to know your test results and keep a list of the medicines you take. How can you care for yourself at home? · Do not smoke or allow others to smoke around you. If you need help quitting, talk to your doctor about stop-smoking programs and medicines. These can increase your chances of quitting for good. · Get plenty of rest and sleep. · Take your medicines exactly as prescribed. Call your doctor if you think you are having a problem with your medicine. · Find healthy ways to deal with stress. ¨ Exercise daily. ¨ Get plenty of sleep. ¨ Eat regularly and well. When should you call for help? Call 911 anytime you think you may need emergency care. For example, call if: 
? · You have severe shortness of breath. ? · You have symptoms of a heart attack. These may include: ¨ Chest pain or pressure, or a strange feeling in the chest. 
¨ Sweating. ¨ Shortness of breath. ¨ Nausea or vomiting. ¨ Pain, pressure, or a strange feeling in the back, neck, jaw, or upper belly or in one or both shoulders or arms. ¨ Lightheadedness or sudden weakness. ¨ A fast or irregular heartbeat. After you call 911, the  may tell you to chew 1 adult-strength or 2 to 4 low-dose aspirin. Wait for an ambulance. Do not try to drive yourself. ?Call your doctor now or seek immediate medical care if: 
? · Your shortness of breath gets worse or you start to wheeze.  Wheezing is a high-pitched sound when you breathe. ? · You wake up at night out of breath or have to prop your head up on several pillows to breathe. ? · You are short of breath after only light activity or while at rest. ? Watch closely for changes in your health, and be sure to contact your doctor if: 
? · You do not get better over the next 1 to 2 days. Where can you learn more? Go to http://rupert-louie.info/. Enter S780 in the search box to learn more about \"Shortness of Breath: Care Instructions. \" Current as of: May 12, 2017 Content Version: 11.4 © 4359-4359 [employer]. Care instructions adapted under license by [employer] (which disclaims liability or warranty for this information). If you have questions about a medical condition or this instruction, always ask your healthcare professional. [place] [address] any warranty or liability for your use of this information. Introducing [facility]! [employer] introduces [email or web address] patient portal. Now you can access parts of your medical record, email your doctor's office, and request medication refills online. 1. In your internet browser, go to [email or web address] 2. Click on the First Time User? Click Here link in the Sign In box. You will see the New Member Sign Up page. 3. Enter your [email or web address] Access Code exactly as it appears below. You will not need to use this code after youve completed the sign-up process. If you do not sign up before the expiration date, you must request a new code. · [email or web address] Access Code: [email or web address] Expires: 3/11/2018  4:11 PM 
 
4. Enter the last four digits of your Social Security Number (xxxx) and Date of Birth (mm/dd/yyyy) as indicated and click Submit. You will be taken to the next sign-up page. 5. Create a [email or web address] ID.  This will be your [email or web address] login ID and cannot be changed, so think of one that is secure and easy to remember. 6. Create a Enish password. You can change your password at any time. 7. Enter your Password Reset Question and Answer. This can be used at a later time if you forget your password. 8. Enter your e-mail address. You will receive e-mail notification when new information is available in [address] 9. Click Sign Up. You can now view and download portions of your medical record. 10. Click the Download Summary menu link to download a portable copy of your medical information. If you have questions, please visit the Frequently Asked Questions section of the Enish website. Remember, Enish is NOT to be used for urgent needs. For medical emergencies, dial 911. Now available from your iPhone and Android! Please provide this summary of care documentation to your next provider. Your primary care clinician is listed as Summer Mackenzie. If you have any questions after today's visit, please call [phone].

## 2018-01-24 NOTE — PROGRESS NOTES
HISTORY OF PRESENT ILLNESS  Mickie Monae is a 43 y.o. female. HPI  Cardiovascular Review:  The patient has hyperlipidemia and obesity. Diet and Lifestyle: nonsmoker, starting Weight Watchers   Home BP Monitoring: is not measured at home. Pertinent ROS: taking medications as instructed, no medication side effects noted, no TIA's, no chest pain on exertion, no dyspnea on exertion, no swelling of ankles. Shortness of Breath  She saw Dr. Giorgi Diggs. Cardiac evaluation wnl. Some worsening of sx with her bronchitis. Now improving. Still has BRUCE. Denies angina, no f/c. No h/o asthma     Review of Systems   Constitutional: Negative for diaphoresis, fever and weight loss. Eyes: Negative for blurred vision and pain. Respiratory: Positive for cough and shortness of breath. Cardiovascular: Negative for chest pain, orthopnea and leg swelling. Neurological: Negative for focal weakness and headaches. Psychiatric/Behavioral: Negative for depression. Patient Active Problem List    Diagnosis Date Noted    [facility] 12/28/2017    Adopted person 12/11/2017    Iron deficiency anemia due to chronic blood loss 06/23/2016    Vitamin D insufficiency 06/23/2016    Abnormal findings on esophagogastroduodenoscopy (EGD) 06/23/2016    Infected lesion in nose 10/04/2015    Hypercalcemia 10/04/2015    MAGGY (generalized anxiety disorder) 09/30/2015    Hypovitaminosis D 01/26/2015    Morbid obesity due to excess calories (HCC) 04/28/2014    Mild sleep apnea 04/28/2014    Migraines        Current Outpatient Prescriptions   Medication Sig Dispense Refill    eletriptan (RELPAX) 40 mg tablet TAKE 1 TAB BY MOUTH DAILY AS NEEDED. 8 Tab 4    albuterol (PROVENTIL HFA, VENTOLIN HFA, PROAIR HFA) 90 mcg/actuation inhaler Use albuterol inhaler 2-4 puffs every 4-6 hours for 48 hours then every 4-6 hours as needed.  1 Inhaler 0    escitalopram oxalate (LEXAPRO) 20 mg tablet TAKE 1 TABLET EVERY DAY 30 Tab 5  multivitamin (ONE A DAY) tablet Take 1 Tab by mouth daily.  predniSONE (STERAPRED DS) 10 mg dose pack Take 2 Tabs by mouth See Admin Instructions. See administration instruction per 10mg dose pack 21 Tab 0    azithromycin (ZITHROMAX) 250 mg tablet Day 1 take 2 tabs by mouth; Days 2-5 take one tab by mouth a day 6 Tab 0    traZODone (DESYREL) 50 mg tablet TAKE 1 TABLET BY MOUTH NIGHTLY. 30 Tab 0    ergocalciferol (ERGOCALCIFEROL) 50,000 unit capsule Take 1 Cap by mouth every seven (7) days. 12 Cap 0    b complex vitamins tablet Take 1 Tab by mouth daily.  ferrous sulfate 325 mg (65 mg iron) tablet   3       Allergies   Allergen Reactions    Hay Fever And Allergy Relief Itching     Rash, sneezing    Pcn [Penicillins] Rash      Visit Vitals    /85 (BP 1 Location: Right arm, BP Patient Position: Sitting)    Pulse (!) 102    Temp 98.5 °F (36.9 °C) (Oral)    Resp 16    Ht 5' 3.15\" (1.604 m)    Wt 241 lb (109.3 kg)    SpO2 96%    BMI 42.49 kg/m2       Physical Exam   Constitutional: She is oriented to person, place, and time. She appears well-developed. No distress. Eyes: Conjunctivae are normal.   Neck: Neck supple. Cardiovascular: Normal rate, regular rhythm and normal heart sounds. Pulmonary/Chest: Effort normal and breath sounds normal. No respiratory distress. She has no wheezes. She has no rales. She exhibits no tenderness. Musculoskeletal: She exhibits no edema (BLE ). Neurological: She is alert and oriented to person, place, and time. Skin: Skin is warm. Psychiatric: She has a normal mood and affect.     ,    Exercise Stress Echocardiography  Patient: Jacob Mcgee  MRN: [id number]  ACCT #: [de-identified]  : 1975  Age: 43 years  Gender: Female  Height: 63 in  Weight: 239 lb  BSA: 2.09 mï¾²  BP: 124/ 90 mmHg  Study date: 27-Dec-2017  Status: Outpatient  Location: [facility] #: [id number]    Ordering Physician: Jesus Freire. Rojelio Hidalgo M.D.   Reading Physician:  Luis Johnson M.D. Referring Physician: Teddy Bennett MD  Reading Group: [facility]  Nurse: Elizabeth Rodriguez. Cathleen Sun RN  Technologist: Jenelle Galdamez RDCS  Exercise Specialist: Yariel Gracia    Impressions: Normal study. Summary:  Stress results: Duration of exercise was 9 min. Maximal work rate was 10.2  METs. Target heart rate was achieved. There was no chest pain during  stress. Maximal systolic blood pressure during stress was 184 mmHg. Maximal diastolic blood pressure during stress was 84 mmHg. ECG conclusions: The stress ECG was normal.    Baseline: There were no regional wall motion abnormalities. Estimated left  ventricular ejection fraction was 60 % . Peak stress: There was normal regional LV function. Echo image interpretation: There was no echocardiographic evidence for  stress-induced ischemia. Indications: Detection of coronary artery disease. Chest pain. Shortness  of breath. Rest ECG: Normal sinus rhythm. ASSESSMENT and PLAN  Diagnoses and all orders for this visit:    1. Shortness of breath-symptoms stable. She has seen cardiology and had a normal cardiology   Exam.  She would benefit from a pulmonary evaluation. I suspect her symptoms are due to obesity hypoventilation syndrome. She is to start a gradual guided exercise program as tolerated while awaiting the pulmonary function test results. Red flags to warrant ER or earlier clinical evaluation reviewed. -     PULMONARY FUNCTION TEST; Future  -     XR CHEST PA LAT; Future    2. Iron deficiency anemia due to chronic blood loss- anemic; to restart iron supplement. 3. Obesity, morbid, BMI 40.0-49.9 ([address]- joined Weight Watchers. I have reviewed/discussed the above normal BMI with the patient. I have recommended the following interventions: dietary management education, guidance, and counseling . Wandy Zuniga           Follow-up Disposition:  Return in about 4 months (around 5/24/2018) for Physical - 30 minute appointment. Medication risks/benefits/costs/interactions/alternatives discussed with patient. Abby Ansari  was given an after visit summary which includes diagnoses, current medications, & vitals. she expressed understanding with the diagnosis and plan.

## 2018-02-01 ENCOUNTER — HOSPITAL ENCOUNTER (OUTPATIENT)
Dept: PULMONOLOGY | Age: 43
Discharge: HOME | End: 2018-02-01
Attending: INTERNAL MEDICINE
Payer: COMMERCIAL

## 2018-02-01 DIAGNOSIS — R06.02 SHORTNESS OF BREATH: ICD-10-CM

## 2018-02-01 PROCEDURE — 94010 BREATHING CAPACITY TEST: CPT

## 2018-02-05 NOTE — PROCEDURES
[address]   PULMONARY FUNCTION    Cara Chavez  MR#: [id number]  : 1975  ACCOUNT #: [de-identified]   DATE OF SERVICE: 2018    PULMONARY FUNCTION STUDIES    CLINICAL INDICATION:  Shortness of breath. Spirometry is performed. Spirometry is within normal limits without evidence of airflow obstruction.   The flow volume loop appears normal.      MD BEATRIZ Perez / ABBE  D: 2018 14:28     T: 2018 14:34  JOB #: [id number]

## 2018-02-16 RX ORDER — ESCITALOPRAM OXALATE 20 MG/1
TABLET ORAL
Qty: 30 TAB | Refills: 2 | Status: SHIPPED | OUTPATIENT
Start: 2018-02-16 | End: 2018-03-14 | Stop reason: SDUPTHER

## 2018-02-20 ENCOUNTER — OFFICE VISIT (OUTPATIENT)
Dept: INTERNAL MEDICINE CLINIC | Age: 43
End: 2018-02-20

## 2018-02-20 VITALS
SYSTOLIC BLOOD PRESSURE: 140 MMHG | RESPIRATION RATE: 14 BRPM | DIASTOLIC BLOOD PRESSURE: 96 MMHG | BODY MASS INDEX: 42.24 KG/M2 | TEMPERATURE: 98.1 F | WEIGHT: 238.4 LBS | HEART RATE: 83 BPM | HEIGHT: 63 IN | OXYGEN SATURATION: 99 %

## 2018-02-20 DIAGNOSIS — R10.2 PELVIC PAIN: ICD-10-CM

## 2018-02-20 DIAGNOSIS — R39.15 URINARY URGENCY: ICD-10-CM

## 2018-02-20 DIAGNOSIS — R03.0 ELEVATED BLOOD PRESSURE READING: ICD-10-CM

## 2018-02-20 DIAGNOSIS — N39.0 RECURRENT UTI: Primary | ICD-10-CM

## 2018-02-20 DIAGNOSIS — E66.01 MORBID OBESITY DUE TO EXCESS CALORIES (HCC): ICD-10-CM

## 2018-02-20 LAB
BILIRUB UR QL STRIP: NEGATIVE
GLUCOSE UR-MCNC: NEGATIVE MG/DL
KETONES P FAST UR STRIP-MCNC: NEGATIVE MG/DL
PH UR STRIP: 6 [PH] (ref 4.6–8)
PROT UR QL STRIP: NEGATIVE
SP GR UR STRIP: 1.01 (ref 1–1.03)
UA UROBILINOGEN AMB POC: NORMAL (ref 0.2–1)
URINALYSIS CLARITY POC: CLEAR
URINALYSIS COLOR POC: NORMAL
URINE BLOOD POC: NORMAL
URINE LEUKOCYTES POC: NEGATIVE
URINE NITRITES POC: NEGATIVE

## 2018-02-20 RX ORDER — SULFAMETHOXAZOLE AND TRIMETHOPRIM 800; 160 MG/1; MG/1
1 TABLET ORAL 2 TIMES DAILY
Qty: 14 TAB | Refills: 0 | Status: SHIPPED | OUTPATIENT
Start: 2018-02-20 | End: 2018-02-27

## 2018-02-20 NOTE — MR AVS SNAPSHOT
[address] [facility] [address] [facility] 57 
[phone] Patient: Efren Rose MRN: [id number] DPR:[id number]/62/[id number] Visit Information Date & Time Provider Department Dept. Phone Encounter #  
 2/20/2018  1:30 PM Ivy Solares MD [email or web address] Internal Medicine [phone] [id number] Follow-up Instructions Return if symptoms worsen or fail to improve within 7 days. Your Appointments 5/31/2018 11:00 AM  
PHYSICAL with Ivy Solares MD  
[email or web address] Internal Medicine [facility]) Appt Note: [address] [facility] [address] [facility] [address]  
[address] [facility] Upcoming Health Maintenance Date Due  
 PAP AKA CERVICAL CYTOLOGY 11/13/2015 DTaP/Tdap/Td series (2 - Td) 4/28/2024 Allergies as of 2/20/2018  Review Complete On: 2/20/2018 By: Ivy Solares MD  
  
 Severity Noted Reaction Type Reactions Hay Fever And Allergy Relief  12/11/2017    Itching Rash, sneezing Pcn [Penicillins]  05/13/2011    Rash Current Immunizations  Reviewed on 1/12/2017 Name Date Influenza Vaccine 12/1/2014, 10/1/2013 Influenza Vaccine Split 11/13/2012 Tdap 4/28/2014 Not reviewed this visit You Were Diagnosed With   
  
 Codes Comments Recurrent UTI    -  Primary ICD-10-CM: N39.0 ICD-9-CM: 599.0 Urinary urgency     ICD-10-CM: R39.15 ICD-9-CM: 594.17 Pelvic pain     ICD-10-CM: R10.2 ICD-9-CM: [id number] Morbid obesity due to excess calories (HCC)     ICD-10-CM: E66.01 
ICD-9-CM: 278.01 Elevated blood pressure reading     ICD-10-CM: R03.0 ICD-9-CM: 796.2 Vitals BP Pulse Temp Resp Height(growth percentile) Weight(growth percentile) (!) 140/96 (BP 1 Location: Right arm, BP Patient Position: Sitting) 83 98.1 °F (36.7 °C) (Oral) 14 5' 3.15\" (1.604 m) 238 lb 6.4 oz (108.1 kg) LMP SpO2 BMI OB Status Smoking Status 02/18/2018 99% 42.03 kg/m2 Having regular periods Never Smoker Vitals History BMI and BSA Data Body Mass Index Body Surface Area 42.03 kg/m 2 2.19 m 2 Preferred Pharmacy Pharmacy Name Phone [facility]/PHARMACY #[id number]- [address] [facility] [phone] Your Updated Medication List  
  
   
This list is accurate as of: 2/20/18  2:08 PM.  Always use your most recent med list.  
  
  
  
  
 albuterol 90 mcg/actuation inhaler Commonly known as:  PROVENTIL HFA, VENTOLIN HFA, PROAIR HFA Use albuterol inhaler 2-4 puffs every 4-6 hours for 48 hours then every 4-6 hours as needed. eletriptan 40 mg tablet Commonly known as:  RELPAX TAKE 1 TAB BY MOUTH DAILY AS NEEDED. escitalopram oxalate 20 mg tablet Commonly known as:  Vernal June TAKE 1 TABLET EVERY DAY  
  
 multivitamin tablet Commonly known as:  ONE A DAY Take 1 Tab by mouth daily. trimethoprim-sulfamethoxazole 160-800 mg per tablet Commonly known as:  BACTRIM DS, SEPTRA DS Take 1 Tab by mouth two (2) times a day for 7 days. Prescriptions Sent to Pharmacy Refills  
 trimethoprim-sulfamethoxazole (BACTRIM DS, SEPTRA DS) 160-800 mg per tablet 0 Sig: Take 1 Tab by mouth two (2) times a day for 7 days. Class: Normal  
 Pharmacy: [facility]/pharmacy #[id number]- [address] [facility] Ph #: [phone] Route: Oral  
  
We Performed the Following AMB POC URINALYSIS DIP STICK AUTO W/O MICRO [67266 CPT(R)] CULTURE, URINE [id number] CPT(R)] URINALYSIS W/MICROSCOPIC [60861 CPT(R)] Follow-up Instructions Return if symptoms worsen or fail to improve within 7 days. Patient Instructions It was a pleasure to see you! As discussed: You have a  UTI. Take the antibiotics as prescribed. I've sent  A culture of the urine to find out what bacteria is causing your symptoms and if the current antibiotics will be effective. If we need to change your medication,our office will call you. Blood pressure - Your blood pressure was slightly high today  
- Since you told me your blood pressure is lower at home. Keep a log of your blood pressure every day. -Bring your blood pressure monitor to your next appointment.  
-Continue to follow a low salt/ low sodium diet (1500mg/ day) -If your blood pressure is too low (<90/60) or too high (>180/100) or you have any symptoms such as chest pain, dizziness, shortness of breath- seek immediate medical attention. Home Blood Pressure Test: About This Test 
What is it? A home blood pressure test allows you to keep track of your blood pressure at home. Blood pressure is a measure of the force of blood against the walls of your arteries. Blood pressure readings include two numbers, such as 130/80 (say \"130 over 80\"). The first number is the systolic pressure. The second number is the diastolic pressure. Why is this test done? You may do this test at home to: · Find out if you have high blood pressure. · Track your blood pressure if you have high blood pressure. · Track how well medicine is working to reduce high blood pressure. · Check how lifestyle changes, such as weight loss and exercise, are affecting blood pressure. How can you prepare for the test? 
· Do not use caffeine, tobacco, or medicines known to raise blood pressure (such as nasal decongestant sprays) for at least 30 minutes before taking your blood pressure. · Do not exercise for at least 30 minutes before taking your blood pressure. What happens before the test? 
Take your blood pressure while you feel comfortable and relaxed.  Sit quietly with both feet on the floor for at least 5 minutes before the test. 
What happens during the test? 
 · Sit with your arm slightly bent and resting on a table so that your upper arm is at the same level as your heart. · Roll up your sleeve or take off your shirt to expose your upper arm. · Wrap the blood pressure cuff around your upper arm so that the lower edge of the cuff is about 1 inch above the bend of your elbow. Proceed with the following steps depending on if you are using an automatic or manual pressure monitor. Automatic blood pressure monitors · Press the on/off button on the automatic monitor and wait until the ready-to-measure \"heart\" symbol appears next to zero in the display window. · Press the start button. The cuff will inflate and deflate by itself. · Your blood pressure numbers will appear on the screen. · Write your numbers in your log book, along with the date and time. Manual blood pressure monitors · Place the earpieces of a stethoscope in your ears, and place the bell of the stethoscope over the artery, just below the cuff. · Close the valve on the rubber inflating bulb. · Squeeze the bulb rapidly with your opposite hand to inflate the cuff until the dial or column of mercury reads about 30 mm Hg higher than your usual systolic pressure. If you do not know your usual pressure, inflate the cuff to 210 mm Hg or until the pulse at your wrist disappears. · Open the pressure valve just slightly by twisting or pressing the valve on the bulb. · As you watch the pressure slowly fall, note the level on the dial at which you first start to hear a pulsing or tapping sound through the stethoscope. This is your systolic blood pressure. · Continue letting the air out slowly. The sounds will become muffled and will finally disappear. Note the pressure when the sounds completely disappear. This is your diastolic blood pressure. Let out all the remaining air. · Write your numbers in your log book, along with the date and time.  
What else should you know about the test? 
 Results for adults ages 25 and older (mm Hg): · Normal (ideal): Systolic 616 or below. Diastolic 79 or below. · Prehypertension: Systolic 211 to 446. Diastolic 80 to 89. · Hypertension: Systolic 700 or above. Diastolic 90 or above. Follow-up care is a key part of your treatment and safety. Be sure to make and go to all appointments, and call your doctor if you are having problems. It's also a good idea to keep a list of the medicines you take. Where can you learn more? Go to http://rupert-louie.info/. Enter C427 in the search box to learn more about \"Home Blood Pressure Test: About This Test.\" Current as of: September 21, 2016 Content Version: 11.4 © 6249-9599 Healthwise, Incorporated. Care instructions adapted under license by [employer] (which disclaims liability or warranty for this information). If you have questions about a medical condition or this instruction, always ask your healthcare professional. [place] [address] any warranty or liability for your use of this information. Introducing \[facility]\"" & HEALTH SERVICES! Martin Hassan introduces [employer] patient portal. Now you can access parts of your medical record, email your doctor's office, and request medication refills online. 1. In your internet browser, go to [email or web address] [email or web address]/[facility] 2. Click on the First Time User? Click Here link in the Sign In box. You will see the New Member Sign Up page. 3. Enter your [facility] [facility] Access Code exactly as it appears below. You will not need to use this code after youve completed the sign-up process. If you do not sign up before the expiration date, you must request a new code. · [employer] Access Code: [email or web address] Expires: 3/11/2018  4:11 PM 
 
4. Enter the last four digits of your Social Security Number (xxxx) and Date of Birth (mm/dd/yyyy) as indicated and click Submit. You will be taken to the next sign-up page. 5. Create a [employer] ID. This will be your [employer] login ID and cannot be changed, so think of one that is secure and easy to remember. 6. Create a [employer] password. You can change your password at any time. 7. Enter your Password Reset Question and Answer. This can be used at a later time if you forget your password. 8. Enter your e-mail address. You will receive e-mail notification when new information is available in [address] 9. Click Sign Up. You can now view and download portions of your medical record. 10. Click the Download Summary menu link to download a portable copy of your medical information. If you have questions, please visit the Frequently Asked Questions section of the [employer] website. Remember, [employer] is NOT to be used for urgent needs. For medical emergencies, dial 911. Now available from your iPhone and Android! Please provide this summary of care documentation to your next provider. Your primary care clinician is listed as Patrick Mcclellan. If you have any questions after today's visit, please call [phone].

## 2018-02-20 NOTE — PROGRESS NOTES
Chief Complaint   Patient presents with    Bladder Infection     1. Have you been to the ER, urgent care clinic since your last visit? Hospitalized since your last visit? No    2. Have you seen or consulted any other health care providers outside of the [address] [place] [address] since your last visit? Include any pap smears or colon screening.  No    Pelvic pain, urinary urgency

## 2018-02-20 NOTE — PATIENT INSTRUCTIONS
It was a pleasure to see you! As discussed: You have a  UTI. Take the antibiotics as prescribed. I've sent  A culture of the urine to find out what bacteria is causing your symptoms and if the current antibiotics will be effective. If we need to change your medication,our office will call you. Blood pressure  - Your blood pressure was slightly high today   - Since you told me your blood pressure is lower at home. Keep a log of your blood pressure every day. -Bring your blood pressure monitor to your next appointment.   -Continue to follow a low salt/ low sodium diet (1500mg/ day)  -If your blood pressure is too low (<90/60) or too high (>180/100) or you have any symptoms such as chest pain, dizziness, shortness of breath- seek immediate medical attention. Home Blood Pressure Test: About This Test  What is it? A home blood pressure test allows you to keep track of your blood pressure at home. Blood pressure is a measure of the force of blood against the walls of your arteries. Blood pressure readings include two numbers, such as 130/80 (say \"130 over 80\"). The first number is the systolic pressure. The second number is the diastolic pressure. Why is this test done? You may do this test at home to:  · Find out if you have high blood pressure. · Track your blood pressure if you have high blood pressure. · Track how well medicine is working to reduce high blood pressure. · Check how lifestyle changes, such as weight loss and exercise, are affecting blood pressure. How can you prepare for the test?  · Do not use caffeine, tobacco, or medicines known to raise blood pressure (such as nasal decongestant sprays) for at least 30 minutes before taking your blood pressure. · Do not exercise for at least 30 minutes before taking your blood pressure. What happens before the test?  Take your blood pressure while you feel comfortable and relaxed.  Sit quietly with both feet on the floor for at least 5 minutes before the test.  What happens during the test?  · Sit with your arm slightly bent and resting on a table so that your upper arm is at the same level as your heart. · Roll up your sleeve or take off your shirt to expose your upper arm. · Wrap the blood pressure cuff around your upper arm so that the lower edge of the cuff is about 1 inch above the bend of your elbow. Proceed with the following steps depending on if you are using an automatic or manual pressure monitor. Automatic blood pressure monitors  · Press the on/off button on the automatic monitor and wait until the ready-to-measure \"heart\" symbol appears next to zero in the display window. · Press the start button. The cuff will inflate and deflate by itself. · Your blood pressure numbers will appear on the screen. · Write your numbers in your log book, along with the date and time. Manual blood pressure monitors  · Place the earpieces of a stethoscope in your ears, and place the bell of the stethoscope over the artery, just below the cuff. · Close the valve on the rubber inflating bulb. · Squeeze the bulb rapidly with your opposite hand to inflate the cuff until the dial or column of mercury reads about 30 mm Hg higher than your usual systolic pressure. If you do not know your usual pressure, inflate the cuff to 210 mm Hg or until the pulse at your wrist disappears. · Open the pressure valve just slightly by twisting or pressing the valve on the bulb. · As you watch the pressure slowly fall, note the level on the dial at which you first start to hear a pulsing or tapping sound through the stethoscope. This is your systolic blood pressure. · Continue letting the air out slowly. The sounds will become muffled and will finally disappear. Note the pressure when the sounds completely disappear. This is your diastolic blood pressure. Let out all the remaining air. · Write your numbers in your log book, along with the date and time.   What else should you know about the test?  Results for adults ages 25 and older (mm Hg):  · Normal (ideal): Systolic 739 or below. Diastolic 79 or below. · Prehypertension: Systolic 848 to 644. Diastolic 80 to 89. · Hypertension: Systolic 174 or above. Diastolic 90 or above. Follow-up care is a key part of your treatment and safety. Be sure to make and go to all appointments, and call your doctor if you are having problems. It's also a good idea to keep a list of the medicines you take. Where can you learn more? Go to http://rupert-louie.info/. Enter C427 in the search box to learn more about \"Home Blood Pressure Test: About This Test.\"  Current as of: September 21, 2016  Content Version: 11.4  © 5757-7392 Healthwise, Incorporated. Care instructions adapted under license by [employer] (which disclaims liability or warranty for this information). If you have questions about a medical condition or this instruction, always ask your healthcare professional. [place] [address] any warranty or liability for your use of this information.

## 2018-02-20 NOTE — PROGRESS NOTES
HISTORY OF PRESENT ILLNESS  Maribel Tyson is a 43 y.o. female. HPI  Abdominal Pain  Patient complains of abdominal pain. The pain is described as pressure, and is 3/10 in intensity. Pain is located in the pelvic region without radiation. Onset was  ago. Symptoms have been gradually worsening since. Aggravating factors: urination. Alleviating factors: none. Associated symptoms: diarrhea, nausea and urgency and frequency. The patient denies constipation. LMP ended 2/18/18     Cardiovascular Review:  The patient has obesity and elevated blood pressure. Diet and Lifestyle: nonsmoker, in weight watchers. She has lost 5lbs   Home BP Monitoring: is not measured at home. Pertinent ROS: taking medications as instructed, no medication side effects noted, no TIA's, no chest pain on exertion, no dyspnea on exertion, no swelling of ankles. ROS  Patient Active Problem List    Diagnosis Date Noted    [facility] 12/28/2017    Adopted person 12/11/2017    Iron deficiency anemia due to chronic blood loss 06/23/2016    Vitamin D insufficiency 06/23/2016    Abnormal findings on esophagogastroduodenoscopy (EGD) 06/23/2016    Infected lesion in nose 10/04/2015    Hypercalcemia 10/04/2015    MAGGY (generalized anxiety disorder) 09/30/2015    Hypovitaminosis D 01/26/2015    Morbid obesity due to excess calories (HCC) 04/28/2014    Mild sleep apnea 04/28/2014    Migraines        Current Outpatient Prescriptions   Medication Sig Dispense Refill    escitalopram oxalate (LEXAPRO) 20 mg tablet TAKE 1 TABLET EVERY DAY 30 Tab 2    eletriptan (RELPAX) 40 mg tablet TAKE 1 TAB BY MOUTH DAILY AS NEEDED. 8 Tab 4    multivitamin (ONE A DAY) tablet Take 1 Tab by mouth daily.  albuterol (PROVENTIL HFA, VENTOLIN HFA, PROAIR HFA) 90 mcg/actuation inhaler Use albuterol inhaler 2-4 puffs every 4-6 hours for 48 hours then every 4-6 hours as needed.  1 Inhaler 0       Allergies   Allergen Reactions    Hay Fever And Allergy Relief Itching     Rash, sneezing    Pcn [Penicillins] Rash      Visit Vitals    BP (!) 140/96 (BP 1 Location: Right arm, BP Patient Position: Sitting)    Pulse 83    Temp 98.1 °F (36.7 °C) (Oral)    Resp 14    Ht 5' 3.15\" (1.604 m)    Wt 238 lb 6.4 oz (108.1 kg)    SpO2 99%    BMI 42.03 kg/m2       Physical Exam   Constitutional: She is oriented to person, place, and time. She appears well-developed and well-nourished. No distress. HENT:   Nose: Nose normal.   Mouth/Throat: Oropharynx is clear and moist.   Eyes: Conjunctivae and lids are normal.   Neck: Neck supple. No thyromegaly present. Cardiovascular: Normal rate, regular rhythm and normal heart sounds. Exam reveals no gallop and no friction rub. No murmur heard. Pulmonary/Chest: Effort normal and breath sounds normal. No respiratory distress. She has no wheezes. She has no rales. She exhibits no tenderness. Abdominal: Soft. Bowel sounds are normal. She exhibits no distension. There is tenderness in the suprapubic area and left lower quadrant. There is no rebound and no CVA tenderness. Musculoskeletal: Normal range of motion. She exhibits no edema or tenderness. Lymphadenopathy:     She has no cervical adenopathy. Neurological: She is alert and oriented to person, place, and time. She displays no tremor. Skin: Skin is warm and dry. She is not diaphoretic. Psychiatric: She has a normal mood and affect.      Recent Results (from the past 12 hour(s))   AMB POC URINALYSIS DIP STICK AUTO W/O MICRO    Collection Time: 02/20/18  1:30 PM   Result Value Ref Range    Color (UA POC) Dark Yellow     Clarity (UA POC) Clear     Glucose (UA POC) Negative Negative    Bilirubin (UA POC) Negative Negative    Ketones (UA POC) Negative Negative    Specific gravity (UA POC) 1.010 1.001 - 1.035    Blood (UA POC) 3+ Negative    pH (UA POC) 6.0 4.6 - 8.0    Protein (UA POC) Negative Negative    Urobilinogen (UA POC) 0.2 mg/dL 0.2 - 1    Nitrites (UA POC) Negative Negative    Leukocyte esterase (UA POC) Negative Negative       ASSESSMENT and PLAN  Diagnoses and all orders for this visit:    1. Recurrent UTI- s/s c/w UTI, Udip may be false negative. Will send specimen to lab for confirmation. Start empiric treatment. If s/s worsen--> CT AP   Red flags to warrant ER or earlier clinical evaluation reviewed. -     trimethoprim-sulfamethoxazole (BACTRIM DS, SEPTRA DS) 160-800 mg per tablet; Take 1 Tab by mouth two (2) times a day for 7 days. 2. Urinary urgency  -     AMB POC URINALYSIS DIP STICK AUTO W/O MICRO    3. Pelvic pain  -     AMB POC URINALYSIS DIP STICK AUTO W/O MICRO  -     CULTURE, URINE  -     URINALYSIS W/MICROSCOPIC    4. Morbid obesity due to excess calories ([address]- congratulated on weight loss. Continue weight watchers. 5. Elevated blood pressure reading- BP elevated in the context of acute illness. Also suspect white coat HTN. No s/s of end organ damage. Stable for out pt management. Check BP at home. Red flags to warrant ER or earlier clinical evaluation reviewed. Follow-up Disposition:  Return if symptoms worsen or fail to improve within 7 days. Medication risks/benefits/costs/interactions/alternatives discussed with patient. Joy Nicole  was given an after visit summary which includes diagnoses, current medications, & vitals. she expressed understanding with the diagnosis and plan.

## 2018-02-22 LAB
APPEARANCE UR: CLEAR
BACTERIA #/AREA URNS HPF: ABNORMAL /[HPF]
BACTERIA UR CULT: NORMAL
BILIRUB UR QL STRIP: NEGATIVE
CASTS URNS QL MICRO: ABNORMAL /LPF
COLOR UR: YELLOW
EPI CELLS #/AREA URNS HPF: ABNORMAL /HPF
GLUCOSE UR QL: NEGATIVE
HGB UR QL STRIP: ABNORMAL
KETONES UR QL STRIP: NEGATIVE
LEUKOCYTE ESTERASE UR QL STRIP: NEGATIVE
MICRO URNS: ABNORMAL
MUCOUS THREADS URNS QL MICRO: PRESENT
NITRITE UR QL STRIP: NEGATIVE
PH UR STRIP: 6.5 [PH] (ref 5–7.5)
PROT UR QL STRIP: NEGATIVE
RBC #/AREA URNS HPF: ABNORMAL /HPF
SP GR UR: 1.01 (ref 1–1.03)
UROBILINOGEN UR STRIP-MCNC: 0.2 MG/DL (ref 0.2–1)
WBC #/AREA URNS HPF: ABNORMAL /HPF

## 2018-02-22 NOTE — PROGRESS NOTES
Please let patient know that her lab urinalysis and culture did not show an infection. If her symptoms  have not improved  she will need to see a urologist as the laboratory urinalysis showed blood which could indicate that she is trying to pass a kidney stone. if she is improving we will repeat her urinalysis in 1 week after she finishes the antibiotic course.

## 2018-02-22 NOTE — PROGRESS NOTES
Patient states she is feeling better since started her abx prescriptions, she will come in after completion for a urinalysis check per drs orders

## 2018-03-01 DIAGNOSIS — N39.0 RECURRENT UTI: Primary | ICD-10-CM

## 2018-03-02 ENCOUNTER — TELEPHONE (OUTPATIENT)
Dept: INTERNAL MEDICINE CLINIC | Age: 43
End: 2018-03-02

## 2018-03-02 LAB
APPEARANCE UR: CLEAR
BILIRUB UR QL STRIP: NEGATIVE
COLOR UR: YELLOW
GLUCOSE UR QL: NEGATIVE
HGB UR QL STRIP: NEGATIVE
KETONES UR QL STRIP: NEGATIVE
LEUKOCYTE ESTERASE UR QL STRIP: NEGATIVE
MICRO URNS: NORMAL
NITRITE UR QL STRIP: NEGATIVE
PH UR STRIP: 6.5 [PH] (ref 5–7.5)
PROT UR QL STRIP: NEGATIVE
SP GR UR: 1.02 (ref 1–1.03)
UROBILINOGEN UR STRIP-MCNC: 0.2 MG/DL (ref 0.2–1)

## 2018-03-02 NOTE — PROGRESS NOTES
Sent to team and phone call to call patient. UA is normal.  She should schedule gynecology given her pelvic pain.

## 2018-03-14 DIAGNOSIS — F41.1 GAD (GENERALIZED ANXIETY DISORDER): ICD-10-CM

## 2018-03-14 DIAGNOSIS — F33.9 RECURRENT DEPRESSION (HCC): Primary | ICD-10-CM

## 2018-03-15 RX ORDER — ESCITALOPRAM OXALATE 20 MG/1
TABLET ORAL
Qty: 90 TAB | Refills: 2 | Status: SHIPPED | OUTPATIENT
Start: 2018-03-15 | End: 2018-12-03 | Stop reason: SDUPTHER

## 2018-05-31 ENCOUNTER — OFFICE VISIT (OUTPATIENT)
Dept: INTERNAL MEDICINE CLINIC | Age: 43
End: 2018-05-31

## 2018-05-31 VITALS
HEART RATE: 87 BPM | HEIGHT: 63 IN | SYSTOLIC BLOOD PRESSURE: 144 MMHG | DIASTOLIC BLOOD PRESSURE: 82 MMHG | OXYGEN SATURATION: 98 % | TEMPERATURE: 98.5 F | RESPIRATION RATE: 16 BRPM | BODY MASS INDEX: 42.24 KG/M2 | WEIGHT: 238.4 LBS

## 2018-05-31 DIAGNOSIS — F43.9 STRESS: ICD-10-CM

## 2018-05-31 DIAGNOSIS — E66.01 CLASS 3 SEVERE OBESITY DUE TO EXCESS CALORIES WITHOUT SERIOUS COMORBIDITY WITH BODY MASS INDEX (BMI) OF 40.0 TO 44.9 IN ADULT (HCC): ICD-10-CM

## 2018-05-31 DIAGNOSIS — Z00.00 WELL WOMAN EXAM (NO GYNECOLOGICAL EXAM): Primary | ICD-10-CM

## 2018-05-31 DIAGNOSIS — G43.909 MIGRAINE WITHOUT STATUS MIGRAINOSUS, NOT INTRACTABLE, UNSPECIFIED MIGRAINE TYPE: ICD-10-CM

## 2018-05-31 DIAGNOSIS — F43.21 GRIEF: ICD-10-CM

## 2018-05-31 DIAGNOSIS — I10 ESSENTIAL HYPERTENSION: ICD-10-CM

## 2018-05-31 RX ORDER — ELETRIPTAN HYDROBROMIDE 40 MG/1
TABLET, FILM COATED ORAL
Qty: 8 TAB | Refills: 4 | Status: SHIPPED | OUTPATIENT
Start: 2018-05-31 | End: 2018-11-05 | Stop reason: SDUPTHER

## 2018-05-31 RX ORDER — LISINOPRIL 10 MG/1
10 TABLET ORAL DAILY
Qty: 30 TAB | Refills: 3 | Status: SHIPPED | OUTPATIENT
Start: 2018-05-31 | End: 2018-07-24 | Stop reason: SDUPTHER

## 2018-05-31 NOTE — PATIENT INSTRUCTIONS
It was a pleasure to see you! As discussed:    High Blood Pressure  You were diagnosed with hypertension today. We started a new blood pressure medication  Please monitor for the side effects we discussed and listed in the pharmacy handout  Complete labs prior to next visit. Please purchase a blood pressure monitor to check your blood pressure at home at least 3-5x/week,if your blood pressure is >180/100 or <90/60  Please contact the office or go to the ER if your pressure is >200/120 or your have  heart palpitations and are dizzy or lightheaded, or you feel like you may faint or you have chest pain  or shortness of breath. Follow a low sodium diet (<1500mg)   Exercise regularly goal, 150 minutes of cardiovascular exercise/ week    Emotional Eating Resources:   Danny [address]957  Address: [address] [facility] [address],   [facility], [address]   Call: [phone]  [email or web address]    Oneill Eliz  Nutrition    [phone]    Dr. Gold Jackson, [email or web address]  Start with Book   But I Deserve This Chocolate!: The Fifty Most Common Diet-Derailing Excuses and How to [address]  What is stress? Stress is what you feel when you have to handle more than you are used to. Stress is a fact of life for most people, and it affects everyone differently. What causes stress for you may not be stressful for someone else. A lot of things can cause stress. You may feel stress when you go on a job interview, take a test, or run a race. This kind of short-term stress is normal and even useful. It can help you if you need to work hard or react quickly. For example, stress can help you finish an important job on time. Stress also can last a long time. Long-term stress is caused by stressful situations or events.  Examples of long-term stress include long-term health problems, ongoing problems at work, or conflicts in your family. Long-term stress can harm your health. How does stress affect your health? When you are stressed, your body responds as though you are in danger. It makes hormones that speed up your heart, make you breathe faster, and give you a burst of energy. This is called the fight-or-flight stress response. If the stress is over quickly, your body goes back to normal and no harm is done. But if stress happens too often or lasts too long, it can have bad effects. Long-term stress can make you more likely to get sick, and it can make symptoms of some diseases worse. If you tense up when you are stressed, you may develop neck, shoulder, or low back pain. Stress is linked to high blood pressure and heart disease. Stress also harms your emotional health. It can make you henry, tense, or depressed. Your relationships may suffer, and you may not do well at work or school. What can you do to manage stress? How to relax your mind  · Write. It may help to write about things that are bothering you. This helps you find out how much stress you feel and what is causing it. When you know this, you can find better ways to cope. · Let your feelings out. Talk, laugh, cry, and express anger when you need to. Talking with friends, family, a counselor, or a member of the clergy about your feelings is a healthy way to relieve stress. · Do something you enjoy. For example, listen to music or go to a movie. Practice your hobby or do volunteer work. · Meditate. This can help you relax, because you are not worrying about what happened before or what may happen in the future. · Do guided imagery. Imagine yourself in any setting that helps you feel calm. You can use audiotapes, books, or a teacher to guide you. How to relax your body  · Do something active. Exercise or activity can help reduce stress. Walking is a great way to get started. Even everyday activities such as housecleaning or yard work can help. · Do breathing exercises. For example:  ¨ From a standing position, bend forward from the waist with your knees slightly bent. Let your arms dangle close to the floor. ¨ Breathe in slowly and deeply as you return to a standing position. Roll up slowly and lift your head last.  ¨ Hold your breath for just a few seconds in the standing position. ¨ Breathe out slowly and bend forward from the waist.  · Try yoga or tiara chi. These techniques combine exercise and meditation. You may need some training at first to learn them. What can you do to prevent stress? · Manage your time. This helps you find time to do the things you want and need to do. · Get enough sleep. Your body recovers from the stresses of the day while you are sleeping. · Get support. Your family, friends, and community can make a difference in how you experience stress. Where can you learn more? Go to http://rupert-louie.info/. Enter G849 in the search box to learn more about \"Learning About Stress. \"  Current as of: July 26, 2016  Content Version: 11.4  © 4786-3776 Healthwise, Incorporated. Care instructions adapted under license by [employer] (which disclaims liability or warranty for this information). If you have questions about a medical condition or this instruction, always ask your healthcare professional. [address] any warranty or liability for your use of this information.

## 2018-05-31 NOTE — MR AVS SNAPSHOT
[address] [place] [address] [address] [place] [address] 
[phone] Patient: Carla Mejia MRN: [id number] OT Visit Information Date & Time Provider Department Dept. Phone Encounter #  
 2018 11:00 AM Anita Martinez MD [facility] Internal Medicine [phone] [id number] Follow-up Instructions Return in about 2 months (around 2018) for Follow-up. Upcoming Health Maintenance Date Due  
 PAP AKA CERVICAL CYTOLOGY 2015 Influenza Age 5 to Adult 2018 DTaP/Tdap/Td series (2 - Td) 2024 Allergies as of 2018  Review Complete On: 2018 By: Anita Martinez MD  
  
 Severity Noted Reaction Type Reactions Hay Fever And Allergy Relief  2017    Itching Rash, sneezing Pcn [Penicillins]  2011    Rash Current Immunizations  Reviewed on 2017 Name Date Influenza Vaccine 2014, 10/1/2013 Influenza Vaccine Split 2012 Tdap 2014 Not reviewed this visit You Were Diagnosed With   
  
 Codes Comments Well woman exam (no gynecological exam)    -  Primary ICD-10-CM: Z00.00 ICD-9-CM: V70.0 Migraine without status migrainosus, not intractable, unspecified migraine type     ICD-10-CM: G43.909 ICD-9-CM: 346.90 Stress     ICD-10-CM: F43.9 ICD-9-CM: V62.89 Grief     ICD-10-CM: A34.54 ICD-9-CM: 309.0 Essential hypertension     ICD-10-CM: I10 
ICD-9-CM: 401.9 Class 3 severe obesity due to excess calories without serious comorbidity with body mass index (BMI) of 40.0 to 44.9 in adult [facility])     ICD-10-CM: E66.01, Z68.41 
ICD-9-CM: 278.01, V85.41 Vitals BP Pulse Temp Resp Height(growth percentile) Weight(growth percentile) 144/82 (BP 1 Location: Right arm, BP Patient Position: Sitting) 87 98.5 °F (36.9 °C) (Oral) 16 5' 3.15\" (1.604 m) 238 lb 6.4 oz (108.1 kg) LMP SpO2 BMI OB Status Smoking Status 05/23/2018 (Approximate) 98% 42.03 kg/m2 Having regular periods Never Smoker BMI and BSA Data Body Mass Index Body Surface Area 42.03 kg/m 2 2.19 m 2 Pref[facility] Pharmacy [id number] [employer] [facility] [phone] Your Updated Medication List  
  
   
This list is accurate as of 5/31/18 12:00 PM.  Always use your most recent med list.  
  
  
  
  
 albuterol 90 mcg/actuation inhaler Commonly known as:  PROVENTIL HFA, VENTOLIN HFA, PROAIR HFA Use albuterol inhaler 2-4 puffs every 4-6 hours for 48 hours then every 4-6 hours as needed. eletriptan 40 mg tablet Commonly known as:  RELPAX TAKE 1 TAB BY MOUTH DAILY AS NEEDED. May repeat in 2hours if 1st dose not effective  
  
 escitalopram oxalate 20 mg tablet Commonly known as:  Buchanan Calk TAKE 1 TABLET EVERY DAY  
  
 lisinopril 10 mg tablet Commonly known as:  Alma [place] Take 1 Tab by mouth daily. multivitamin tablet Commonly known as:  ONE A DAY Take 1 Tab by mouth daily. VITAMIN D3 PO Take  by mouth. Prescriptions Sent to Pharmacy Refills  
 eletriptan (RELPAX) 40 mg tablet 4 Sig: TAKE 1 TAB BY MOUTH DAILY AS NEEDED. May repeat in 2hours if 1st dose not effective Class: Normal  
 Pharmacy: [id number]- [facility] Ph #: [phone]  
 lisinopril (PRINIVIL, ZESTRIL) 10 mg tablet 3 Sig: Take 1 Tab by mouth daily. Class: Normal  
 [id number] [employer] [facility] Ph #: [phone] Route: Oral  
  
We Performed the Following LIPID PANEL [91881 CPT(R)] METABOLIC PANEL, COMPREHENSIVE [23094 CPT(R)] Follow-up Instructions Return in about 2 months (around 7/31/2018) for Follow-up. Patient Instructions It was a pleasure to see you! As discussed: 
 
High Blood Pressure You were diagnosed with hypertension today. We started a new blood pressure medication Please monitor for the side effects we discussed and listed in the pharmacy handout Complete labs prior to next visit. Please purchase a blood pressure monitor to check your blood pressure at home at least 3-5x/week,if your blood pressure is >180/100 or <90/60  Please contact the office or go to the ER if your pressure is >200/120 or your have  heart palpitations and are dizzy or lightheaded, or you feel like you may faint or you have chest pain  or shortness of breath. Follow a low sodium diet (<1500mg) Exercise regularly goal, 150 minutes of cardiovascular exercise/ week Emotional Eating Resources:  
Danny [address]957 Address: [address], [place], [address] [place] [address] Avenue Call: [phone] 
[email or web address] 
 
[facility] [phone] Dr. Ale Hanna, [email or web address] Start with Book But I Deserve This Chocolate!: The Fifty Most Common Diet-Derailing Excuses and How to [employer] Learning About Stress What is stress? Stress is what you feel when you have to handle more than you are used to. Stress is a fact of life for most people, and it affects everyone differently. What causes stress for you may not be stressful for someone else. A lot of things can cause stress. You may feel stress when you go on a job interview, take a test, or run a race. This kind of short-term stress is normal and even useful. It can help you if you need to work hard or react quickly. For example, stress can help you finish an important job on time. Stress also can last a long time. Long-term stress is caused by stressful situations or events. Examples of long-term stress include long-term health problems, ongoing problems at work, or conflicts in your family. Long-term stress can harm your health. How does stress affect your health? When you are stressed, your body responds as though you are in danger. It makes hormones that speed up your heart, make you breathe faster, and give you a burst of energy. This is called the fight-or-flight stress response. If the stress is over quickly, your body goes back to normal and no harm is done. But if stress happens too often or lasts too long, it can have bad effects. Long-term stress can make you more likely to get sick, and it can make symptoms of some diseases worse. If you tense up when you are stressed, you may develop neck, shoulder, or low back pain. Stress is linked to high blood pressure and heart disease. Stress also harms your emotional health. It can make you henry, tense, or depressed. Your relationships may suffer, and you may not do well at work or school. What can you do to manage stress? How to relax your mind · Write. It may help to write about things that are bothering you. This helps you find out how much stress you feel and what is causing it. When you know this, you can find better ways to cope. · Let your feelings out. Talk, laugh, cry, and express anger when you need to. Talking with friends, family, a counselor, or a member of the clergy about your feelings is a healthy way to relieve stress. · Do something you enjoy. For example, listen to music or go to a movie. Practice your hobby or do volunteer work. · Meditate. This can help you relax, because you are not worrying about what happened before or what may happen in the future. · Do guided imagery. Imagine yourself in any setting that helps you feel calm. You can use audiotapes, books, or a teacher to guide you. How to relax your body · Do something active. Exercise or activity can help reduce stress. Walking is a great way to get started. Even everyday activities such as housecleaning or yard work can help. · Do breathing exercises. For example: ¨ From a standing position, bend forward from the waist with your knees slightly bent. Let your arms dangle close to the floor. ¨ Breathe in slowly and deeply as you return to a standing position. Roll up slowly and lift your head last. 
¨ Hold your breath for just a few seconds in the standing position. ¨ Breathe out slowly and bend forward from the waist. 
· Try yoga or tiara chi. These techniques combine exercise and meditation. You may need some training at first to learn them. What can you do to prevent stress? · Manage your time. This helps you find time to do the things you want and need to do. · Get enough sleep. Your body recovers from the stresses of the day while you are sleeping. · Get support. Your family, friends, and community can make a difference in how you experience stress. Where can you learn more? Go to http://rupert-louie.info/. Enter A620 in the search box to learn more about \"Learning About Stress. \" Current as of: July 26, 2016 Content Version: 11.4 © 0552-4318 [employer]. Care instructions adapted under license by National Institutes of Health (NIH) (which disclaims liability or warranty for this information). If you have questions about a medical condition or this instruction, always ask your healthcare professional. [address] any warranty or liability for your use of this information. Introducing \[facility]\"" & HEALTH SERVICES! Adriane Lux introduces [employer] patient portal. Now you can access parts of your medical record, email your doctor's office, and request medication refills online. 1. In your internet browser, go to [email or web address] 2. Click on the First Time User? Click Here link in the Sign In box. You will see the New Member Sign Up page. 3. Enter your [employer] Access Code exactly as it appears below.  You will not need to use this code after youve completed the sign-up process. If you do not sign up before the expiration date, you must request a new code. · GERS Access Code: [email or web address] Expires: 8/29/2018 11:06 AM 
 
4. Enter the last four digits of your Social Security Number (xxxx) and Date of Birth (mm/dd/yyyy) as indicated and click Submit. You will be taken to the next sign-up page. 5. Create a GERS ID. This will be your GERS login ID and cannot be changed, so think of one that is secure and easy to remember. 6. Create a GERS password. You can change your password at any time. 7. Enter your Password Reset Question and Answer. This can be used at a later time if you forget your password. 8. Enter your e-mail address. You will receive e-mail notification when new information is available in [address]. 9. Click Sign Up. You can now view and download portions of your medical record. 10. Click the Download Summary menu link to download a portable copy of your medical information. If you have questions, please visit the Frequently Asked Questions section of the GERS website. Remember, GERS is NOT to be used for urgent needs. For medical emergencies, dial 911. Now available from your iPhone and Android! Please provide this summary of care documentation to your next provider. Your primary care clinician is listed as Norah Mojica. If you have any questions after today's visit, please call [phone].

## 2018-05-31 NOTE — PROGRESS NOTES
Chief Complaint   Patient presents with    Physical    Weight Management    Medication Refill     Patient states she is here for her physical and is interested in weight loss ideas. Patient voiced she does not like the weight loss shakes or liquid diets. Would need medication refill.

## 2018-05-31 NOTE — PROGRESS NOTES
Subjective:   43 y.o. female for Well Woman Check. Her gyne and breast care is done elsewhere by her Ob-Gyne physician. ROS: Feeling generally well. No TIA's or unusual headaches (but still having frequent migraines), no(but still having frequent migraines dysphagia. No prolonged cough. No dyspnea or chest pain on exertion. No abdominal pain, change in bowel habits, black or bloody stools. No urinary tract symptoms. No new or unusual musculoskeletal symptoms. Specific concerns today:  Cardiovascular Review:  The patient has hypertension and obesity. + Stress  eater and has had multiple stressors - work and family. Father  8 months ago. Mother is moving in with her and she had CKD 4. Trying to lose weight but feels overwhelmed. Work she just won a controversial case. Diet and Lifestyle: exercises sporadically, nonsmoker   Support: best friend moving to [place], most friends live out of state  Spiritual: Strong august attends [employer] but not member. Hs quiet time. Home BP Monitoring: is not measured at home. Pertinent ROS: no TIA's, no chest pain on exertion, no dyspnea on exertion, no swelling of ankles. Depression  Patient is seen for followup of depression. Treatment includes Lexapro and no other therapies. .   she experiences the following side effects from the treatment: none. Objective: The patient appears well, alert, oriented x 3, in no distress. Visit Vitals    /82 (BP 1 Location: Right arm, BP Patient Position: Sitting)    Pulse 87    Temp 98.5 °F (36.9 °C) (Oral)    Resp 16    Ht 5' 3.15\" (1.604 m)    Wt 238 lb 6.4 oz (108.1 kg)    LMP 2018 (Approximate)    SpO2 98%    BMI 42.03 kg/m2     ENT normal.  Neck supple. No adenopathy or thyromegaly. Lungs are clear, good air entry, no wheezes, rhonchi or rales. S1 and S2 normal, no murmurs, regular rate and rhythm. Abdomen soft without tenderness, guarding, mass or organomegaly.  Extremities show no edema, normal peripheral pulses. Neurological is normal, no focal findings. Psych: tearful  Breast and Pelvic exams are deferred. Assessment/Plan:   Diagnoses and all orders for this visit:    1. Well woman exam (no gynecological exam)- Health Maintenance reviewed and addressed as ordered below     2. Migraine without status migrainosus, not intractable, unspecified migraine type- not well controlled. Add lisinopril. -     eletriptan (RELPAX) 40 mg tablet; TAKE 1 TAB BY MOUTH DAILY AS NEEDED. May repeat in 2hours if 1st dose not effective  -     lisinopril (PRINIVIL, ZESTRIL) 10 mg tablet; Take 1 Tab by mouth daily. 3. Stress- not well controlled and contributing to weight gain/ obesity. Recommended pt try finding a therapist using the list provided and www. [employer].      4. Grief- Recommended pt try finding a therapist using the list provided and www. [employer]ot. See AVS for full details of plan and patient discussion. List provided. 5. Essential hypertension- BP elevated at multiple visits. Start lisinopril. To measure BP at home and notify office if >180/100 or <90/60  Red flags to warrant ER or earlier clinical evaluation reviewed. -     lisinopril (PRINIVIL, ZESTRIL) 10 mg tablet; Take 1 Tab by mouth daily.  -     METABOLIC PANEL, COMPREHENSIVE  -     LIPID PANEL    6. Class 3 severe obesity due to excess calories without serious comorbidity with body mass index (BMI) of 40.0 to 44.9 in [facility])- see above       Follow-up Disposition:  Return in about 2 months (around 7/31/2018) for Follow-up. Medication risks/benefits/costs/interactions/alternatives discussed with patient. Zack Jackson  was given an after visit summary which includes diagnoses, current medications, & vitals. she expressed understanding with the diagnosis and plan.

## 2018-06-05 LAB
ALBUMIN SERPL-MCNC: 4.4 G/DL (ref 3.5–5.5)
ALBUMIN/GLOB SERPL: 1.9 {RATIO} (ref 1.2–2.2)
ALP SERPL-CCNC: 127 IU/L (ref 39–117)
ALT SERPL-CCNC: 29 IU/L (ref 0–32)
AST SERPL-CCNC: 23 IU/L (ref 0–40)
BILIRUB SERPL-MCNC: 0.3 MG/DL (ref 0–1.2)
BUN SERPL-MCNC: 9 MG/DL (ref 6–24)
BUN/CREAT SERPL: 12 (ref 9–23)
CALCIUM SERPL-MCNC: 10.8 MG/DL (ref 8.7–10.2)
CHLORIDE SERPL-SCNC: 102 MMOL/L (ref 96–106)
CHOLEST SERPL-MCNC: 228 MG/DL (ref 100–199)
CO2 SERPL-SCNC: 21 MMOL/L (ref 18–29)
CREAT SERPL-MCNC: 0.73 MG/DL (ref 0.57–1)
GFR SERPLBLD CREATININE-BSD FMLA CKD-EPI: 102 ML/MIN/1.73
GFR SERPLBLD CREATININE-BSD FMLA CKD-EPI: 117 ML/MIN/1.73
GLOBULIN SER CALC-MCNC: 2.3 G/DL (ref 1.5–4.5)
GLUCOSE SERPL-MCNC: 93 MG/DL (ref 65–99)
HDLC SERPL-MCNC: 54 MG/DL
LDLC SERPL CALC-MCNC: 140 MG/DL (ref 0–99)
POTASSIUM SERPL-SCNC: 4.7 MMOL/L (ref 3.5–5.2)
PROT SERPL-MCNC: 6.7 G/DL (ref 6–8.5)
SODIUM SERPL-SCNC: 137 MMOL/L (ref 134–144)
TRIGL SERPL-MCNC: 169 MG/DL (ref 0–149)
VLDLC SERPL CALC-MCNC: 34 MG/DL (ref 5–40)

## 2018-06-25 ENCOUNTER — TELEPHONE (OUTPATIENT)
Dept: INTERNAL MEDICINE CLINIC | Age: 43
End: 2018-06-25

## 2018-06-25 NOTE — TELEPHONE ENCOUNTER
Josefina Vázquez (Self) [phone]     Pt is requesting a call back regarding changing her migraine meds.

## 2018-07-24 ENCOUNTER — OFFICE VISIT (OUTPATIENT)
Dept: INTERNAL MEDICINE CLINIC | Age: 43
End: 2018-07-24

## 2018-07-24 VITALS
RESPIRATION RATE: 16 BRPM | OXYGEN SATURATION: 99 % | DIASTOLIC BLOOD PRESSURE: 84 MMHG | HEART RATE: 82 BPM | BODY MASS INDEX: 42.74 KG/M2 | TEMPERATURE: 98.6 F | WEIGHT: 241.2 LBS | HEIGHT: 63 IN | SYSTOLIC BLOOD PRESSURE: 122 MMHG

## 2018-07-24 DIAGNOSIS — F41.1 GAD (GENERALIZED ANXIETY DISORDER): ICD-10-CM

## 2018-07-24 DIAGNOSIS — G43.909 MIGRAINE WITHOUT STATUS MIGRAINOSUS, NOT INTRACTABLE, UNSPECIFIED MIGRAINE TYPE: ICD-10-CM

## 2018-07-24 DIAGNOSIS — I10 ESSENTIAL HYPERTENSION: Primary | ICD-10-CM

## 2018-07-24 RX ORDER — LISINOPRIL 20 MG/1
20 TABLET ORAL DAILY
Qty: 30 TAB | Refills: 4 | Status: SHIPPED | OUTPATIENT
Start: 2018-07-24 | End: 2018-09-23 | Stop reason: SDUPTHER

## 2018-07-24 NOTE — PROGRESS NOTES
Chief Complaint   Patient presents with    Hypertension     1. Have you been to the ER, urgent care clinic since your last visit? Hospitalized since your last visit? No    2. Have you seen or consulted any other health care providers outside of the [address] [place] [address] since your last visit? Include any pap smears or colon screening.  No     Migraine this AM, 1 every 2 weeks

## 2018-07-24 NOTE — PROGRESS NOTES
HISTORY OF PRESENT ILLNESS  Jose Morales is a 43 y.o. female. HPI  Migraines   Still having frequent migraines- 2 HA per week. She is completing her monthly rx of   She has seen neurology in the past. She was on topamax for several years which did not help and she had cognitive side effects. Cardiovascular Review:  The patient has hypertension and obesity. Diet and Lifestyle: not attempting to follow a low fat, low cholesterol diet, not attempting to follow a low sodium diet, exercises regularly, nonsmoker, alcohol intake none, she has started workout videos  Home BP Monitoring: is not measured at home. Pertinent ROS: taking medications as instructed, no medication side effects noted, no TIA's, no chest pain on exertion, notes stable dyspnea on exertion- improving with exercise, no change, no swelling of ankles. ROSper HPI     Patient Active Problem List    Diagnosis Date Noted    [facility] 12/28/2017    Adopted person 12/11/2017    Iron deficiency anemia due to chronic blood loss 06/23/2016    Vitamin D insufficiency 06/23/2016    Abnormal findings on esophagogastroduodenoscopy (EGD) 06/23/2016    Infected lesion in nose 10/04/2015    Hypercalcemia 10/04/2015    MAGGY (generalized anxiety disorder) 09/30/2015    Hypovitaminosis D 01/26/2015    Morbid obesity due to excess calories ([facility] 04/28/2014    Mild sleep apnea 04/28/2014    Migraines        Current Outpatient Prescriptions   Medication Sig Dispense Refill    cholecalciferol, vitamin D3, (VITAMIN D3 PO) Take  by mouth.  eletriptan (RELPAX) 40 mg tablet TAKE 1 TAB BY MOUTH DAILY AS NEEDED. May repeat in 2hours if 1st dose not effective 8 Tab 4    lisinopril (PRINIVIL, ZESTRIL) 10 mg tablet Take 1 Tab by mouth daily. 30 Tab 3    escitalopram oxalate (LEXAPRO) 20 mg tablet TAKE 1 TABLET EVERY DAY 90 Tab 2    multivitamin (ONE A DAY) tablet Take 1 Tab by mouth daily.       albuterol (PROVENTIL HFA, VENTOLIN HFA, PROAIR HFA) 90 mcg/actuation inhaler Use albuterol inhaler 2-4 puffs every 4-6 hours for 48 hours then every 4-6 hours as needed. 1 Inhaler 0       Allergies   Allergen Reactions    Hay Fever And Allergy Relief Itching     Rash, sneezing    Pcn [Penicillins] Rash      Visit Vitals    /84 (BP 1 Location: Right arm, BP Patient Position: Sitting)    Pulse 82    Temp 98.6 °F (37 °C) (Oral)    Resp 16    Ht 5' 3.15\" (1.604 m)    Wt 241 lb 3.2 oz (109.4 kg)    SpO2 99%    BMI 42.52 kg/m2       Physical Exam   Constitutional: She is oriented to person, place, and time. She appears well-developed. No distress. Eyes: Conjunctivae are normal.   Neck: Neck supple. Cardiovascular: Normal rate, regular rhythm and normal heart sounds. Pulmonary/Chest: Effort normal and breath sounds normal. No respiratory distress. She has no wheezes. She has no rales. She exhibits no tenderness. Musculoskeletal: She exhibits no edema (BLE ). Neurological: She is alert and oriented to person, place, and time. Skin: Skin is warm. Psychiatric: She has a normal mood and affect. ASSESSMENT and PLAN  Diagnoses and all orders for this visit:    1. Essential hypertension- improved. Work on diet optimization. Schedule with psychologist to figure her triggers for overeating. I have reviewed/discussed the above normal BMI with the patient. I have recommended the following interventions: dietary management education, guidance, and counseling . .      -     lisinopril (PRINIVIL, ZESTRIL) 20 mg tablet; Take 1 Tab by mouth daily. 2. Migraine without status migrainosus, not intractable, unspecified migraine type-not well controlled but per pt worse in spring and summer. Will titrate lisinopril. Monitor for hypotension. Declines neurology referral at this time but willing to go if increase lisinopril not effective. -     lisinopril (PRINIVIL, ZESTRIL) 20 mg tablet; Take 1 Tab by mouth daily.     3. MAGGY (generalized anxiety disorder)- Try finding a therapist using the list provided and www. psychologytoday.com. Patient Education:  Reviewed concept of anxiety as biochemical imbalance of neurotransmitters and rationale for treatment. Instructed patient to contact office or 911 promptly should condition worsen or any new symptoms appear and provided on-call telephone numbers. IF THE PATIENT HAS ANY SUICIDAL OR HOMICIDAL IDEATION, CALL THE OFFICE, DISCUSS WITH A SUPPORT MEMBER OR GO TO THE ER IMMEDIATELY. Patient was agreeable with this      Follow-up Disposition:  Return in about 3 months (around 10/24/2018) for Follow-up, Hypertension & migraines . Medication risks/benefits/costs/interactions/alternatives discussed with patient. Camron Olivera  was given an after visit summary which includes diagnoses, current medications, & vitals. she expressed understanding with the diagnosis and plan.

## 2018-07-24 NOTE — MR AVS SNAPSHOT
[address] [address] 
[phone] Patient: Milton Mendez MRN: [id number] QNP:[id number] Visit Information Date & Time Provider Department Dept. Phone Encounter #  
 7/24/2018  3:30 PM Cassius De La Rosa MD [address] [address] Internal Medicine [phone] [id number] Follow-up Instructions Return in about 3 months (around 10/24/2018) for Follow-up, Hypertension & migraines . Upcoming Health Maintenance Date Due Influenza Age 5 to Adult 8/1/2018 PAP AKA CERVICAL CYTOLOGY 1/24/2021 DTaP/Tdap/Td series (2 - Td) 4/28/2024 Allergies as of 7/24/2018  Review Complete On: 7/24/2018 By: Cassius De La Rosa MD  
  
 Severity Noted Reaction Type Reactions Hay Fever And Allergy Relief  12/11/2017    Itching Rash, sneezing Pcn [Penicillins]  05/13/2011    Rash Current Immunizations  Reviewed on 1/12/2017 Name Date Influenza Vaccine 12/1/2014, 10/1/2013 Influenza Vaccine Split 11/13/2012 Tdap 4/28/2014 Not reviewed this visit You Were Diagnosed With   
  
 Codes Comments Essential hypertension    -  Primary ICD-10-CM: I10 
ICD-9-CM: 401.9 Migraine without status migrainosus, not intractable, unspecified migraine type     ICD-10-CM: G43.909 ICD-9-CM: 346.90   
 MAGGY (generalized anxiety disorder)     ICD-10-CM: F41.1 ICD-9-CM: 300.02 Vitals BP Pulse Temp Resp Height(growth percentile) Weight(growth percentile) 122/84 (BP 1 Location: Right arm, BP Patient Position: Sitting) 82 98.6 °F (37 °C) (Oral) 16 5' 3.15\" (1.604 m) 241 lb 3.2 oz (109.4 kg) LMP SpO2 BMI OB Status Smoking Status 07/22/2018 99% 42.52 kg/m2 Having regular periods Never Smoker Vitals History BMI and BSA Data Body Mass Index Body Surface Area 42.52 kg/m 2 2.21 m 2 Preferred Pharmacy Pharmacy Name Phone [facility]ACY #[id number]- [place] [address] [facility] [phone] Your Updated Medication List  
  
   
This list is accurate as of 7/24/18  4:27 PM.  Always use your most recent med list.  
  
  
  
  
 albuterol 90 mcg/actuation inhaler Commonly known as:  PROVENTIL HFA, VENTOLIN HFA, PROAIR HFA Use albuterol inhaler 2-4 puffs every 4-6 hours for 48 hours then every 4-6 hours as needed. eletriptan 40 mg tablet Commonly known as:  RELPAX TAKE 1 TAB BY MOUTH DAILY AS NEEDED. May repeat in 2hours if 1st dose not effective  
  
 escitalopram oxalate 20 mg tablet Commonly known as:  Gradie Kil TAKE 1 TABLET EVERY DAY  
  
 lisinopril 20 mg tablet Commonly known as:  Wendy Luiza Take 1 Tab by mouth daily. multivitamin tablet Commonly known as:  ONE A DAY Take 1 Tab by mouth daily. VITAMIN D3 PO Take  by mouth. Prescriptions Sent to Pharmacy Refills  
 lisinopril (PRINIVIL, ZESTRIL) 20 mg tablet 4 Sig: Take 1 Tab by mouth daily. Class: Normal  
 Pharmacy: [facility]/pharmacy #[id number]- [place] [facility] Ph #: [phone] Route: Oral  
  
Follow-up Instructions Return in about 3 months (around 10/24/2018) for Follow-up, Hypertension & migraines . Patient Instructions It was a pleasure to see you! As discussed: We will increase your lisinopril and see if this improves your migraines. Deciding About Taking Medicine to Prevent Migraines Deciding About Taking Medicine to Prevent Migraines What are migraines? Migraines are painful, throbbing headaches. They can last from 4 to 72 hours. They often occur on only one side of your head. But you may feel them on both sides. The pain may keep you from doing your daily activities. You may take a daily medicine if you get bad migraines often. This can help prevent them. What are davis points about this decision? · Medicines to prevent migraines may not stop them every time. But if you take them daily, you can reduce how many migraines you get by more than half. They can also reduce how long migraines last. And your symptoms may not be as bad. · Medicines that prevent migraines may cause side effects. You may have sleep and memory problems, upset stomach, dry mouth, or constipation. Some of these side effects may last for as long as you take the medicine. Or they may go away within a few weeks. Why might you choose to take medicine to prevent migraines? · You are willing to take medicine daily if it will help your symptoms. · You don't think the side effects of the medicine could be as bad as your migraine symptoms. · Your migraines get in the way of your work. Or they harm your relationships with friends and family. · Benefits of medicine include fewer or no migraines. And your migraines may not last as long or feel as bad. Why might you choose not to take medicine to prevent migraines? · You want to avoid the side effects of the medicine. · You don't want to take medicine every day. · Your migraines are not affecting your work and relationships. · If your symptoms don't improve with home treatment and other medicines, you can decide later to take medicine every day to help prevent migraines. Your decision Thinking about the facts and your feelings can help you make a decision that is right for you. Be sure you understand the benefits and risks of your options, and think about what else you need to do before you make the decision. Where can you learn more? Go to http://rupert-louie.info/. Enter V612 in the search box to learn more about \"Deciding About Taking Medicine to Prevent Migraines. \" Current as of: October 9, 2017 Content Version: 11.7 © 1032-4005 [employer].  Care instructions adapted under license by [address] (which disclaims liability or warranty for this information). If you have questions about a medical condition or this instruction, always ask your healthcare professional. [address] any warranty or liability for your use of this information. Introducing [facility]! [facility] introduces [employer] patient portal. Now you can access parts of your medical record, email your doctor's office, and request medication refills online. 1. In your internet browser, go to [email or web address] 2. Click on the First Time User? Click Here link in the Sign In box. You will see the New Member Sign Up page. 3. Enter your [employer] Access Code exactly as it appears below. You will not need to use this code after youve completed the sign-up process. If you do not sign up before the expiration date, you must request a new code. · [employer] Access Code: [email or web address] Expires: 8/29/2018 11:06 AM 
 
4. Enter the last four digits of your Social Security Number (xxxx) and Date of Birth (mm/dd/yyyy) as indicated and click Submit. You will be taken to the next sign-up page. 5. Create a [employer] ID. This will be your [employer] login ID and cannot be changed, so think of one that is secure and easy to remember. 6. Create a [employer] password. You can change your password at any time. 7. Enter your Password Reset Question and Answer. This can be used at a later time if you forget your password. 8. Enter your e-mail address. You will receive e-mail notification when new information is available in [address] 9. Click Sign Up. You can now view and download portions of your medical record. 10. Click the Download Summary menu link to download a portable copy of your medical information. If you have questions, please visit the Frequently Asked Questions section of the [employer] website.  Remember, [employer] is NOT to be used for urgent needs. For medical emergencies, dial 911. Now available from your iPhone and Android! Please provide this summary of care documentation to your next provider. Your primary care clinician is listed as Jian Blankenship. If you have any questions after today's visit, please call [phone].

## 2018-07-24 NOTE — PATIENT INSTRUCTIONS
It was a pleasure to see you! As discussed: We will increase your lisinopril and see if this improves your migraines. Deciding About Taking Medicine to Prevent Migraines  Deciding About Taking Medicine to Prevent Migraines  What are migraines? Migraines are painful, throbbing headaches. They can last from 4 to 72 hours. They often occur on only one side of your head. But you may feel them on both sides. The pain may keep you from doing your daily activities. You may take a daily medicine if you get bad migraines often. This can help prevent them. What are key points about this decision? · Medicines to prevent migraines may not stop them every time. But if you take them daily, you can reduce how many migraines you get by more than half. They can also reduce how long migraines last. And your symptoms may not be as bad. · Medicines that prevent migraines may cause side effects. You may have sleep and memory problems, upset stomach, dry mouth, or constipation. Some of these side effects may last for as long as you take the medicine. Or they may go away within a few weeks. Why might you choose to take medicine to prevent migraines? · You are willing to take medicine daily if it will help your symptoms. · You don't think the side effects of the medicine could be as bad as your migraine symptoms. · Your migraines get in the way of your work. Or they harm your relationships with friends and family. · Benefits of medicine include fewer or no migraines. And your migraines may not last as long or feel as bad. Why might you choose not to take medicine to prevent migraines? · You want to avoid the side effects of the medicine. · You don't want to take medicine every day. · Your migraines are not affecting your work and relationships. · If your symptoms don't improve with home treatment and other medicines, you can decide later to take medicine every day to help prevent migraines.   Your decision  Thinking about the facts and your feelings can help you make a decision that is right for you. Be sure you understand the benefits and risks of your options, and think about what else you need to do before you make the decision. Where can you learn more? Go to http://rupert-louie.info/. Enter F742 in the search box to learn more about \"Deciding About Taking Medicine to Prevent Migraines. \"  Current as of: October 9, 2017  Content Version: 11.7  © 1889-0543 [employer]. Care instructions adapted under license by [employer] - is now [employer] (which disclaims liability or warranty for this information). If you have questions about a medical condition or this instruction, always ask your healthcare professional. [address] any warranty or liability for your use of this information.

## 2018-11-02 DIAGNOSIS — G43.909 MIGRAINE WITHOUT STATUS MIGRAINOSUS, NOT INTRACTABLE, UNSPECIFIED MIGRAINE TYPE: ICD-10-CM

## 2018-11-05 DIAGNOSIS — G43.909 MIGRAINE WITHOUT STATUS MIGRAINOSUS, NOT INTRACTABLE, UNSPECIFIED MIGRAINE TYPE: ICD-10-CM

## 2018-11-06 RX ORDER — ELETRIPTAN HYDROBROMIDE 40 MG/1
TABLET, FILM COATED ORAL
Qty: 8 TAB | Refills: 4 | OUTPATIENT
Start: 2018-11-06

## 2018-11-06 RX ORDER — ELETRIPTAN HYDROBROMIDE 40 MG/1
TABLET, FILM COATED ORAL
Qty: 8 TAB | Refills: 4 | Status: SHIPPED | OUTPATIENT
Start: 2018-11-06 | End: 2019-04-29 | Stop reason: SDUPTHER

## 2019-03-11 DIAGNOSIS — F41.1 GAD (GENERALIZED ANXIETY DISORDER): ICD-10-CM

## 2019-03-11 RX ORDER — ESCITALOPRAM OXALATE 20 MG/1
TABLET ORAL
Qty: 90 TAB | Refills: 0 | OUTPATIENT
Start: 2019-03-11

## 2019-03-13 RX ORDER — ESCITALOPRAM OXALATE 20 MG/1
TABLET ORAL
Qty: 90 TAB | Refills: 0 | OUTPATIENT
Start: 2019-03-13

## 2019-04-11 ENCOUNTER — HOSPITAL ENCOUNTER (OUTPATIENT)
Dept: MAMMOGRAPHY | Age: 44
Discharge: HOME | End: 2019-04-11
Attending: INTERNAL MEDICINE
Payer: COMMERCIAL

## 2019-04-11 DIAGNOSIS — Z12.31 OTHER SCREENING MAMMOGRAM: ICD-10-CM

## 2019-04-11 PROCEDURE — 77067 SCR MAMMO BI INCL CAD: CPT

## 2019-04-29 DIAGNOSIS — G43.909 MIGRAINE WITHOUT STATUS MIGRAINOSUS, NOT INTRACTABLE, UNSPECIFIED MIGRAINE TYPE: ICD-10-CM

## 2019-04-29 RX ORDER — ELETRIPTAN HYDROBROMIDE 40 MG/1
TABLET, FILM COATED ORAL
Qty: 8 TAB | Refills: 1 | Status: SHIPPED | OUTPATIENT
Start: 2019-04-29 | End: 2019-06-05 | Stop reason: SDUPTHER

## 2019-05-08 DIAGNOSIS — F41.1 GAD (GENERALIZED ANXIETY DISORDER): ICD-10-CM

## 2019-05-08 RX ORDER — ESCITALOPRAM OXALATE 20 MG/1
TABLET ORAL
Qty: 30 TAB | Refills: 1 | Status: CANCELLED | OUTPATIENT
Start: 2019-05-08

## 2019-05-08 NOTE — TELEPHONE ENCOUNTER
Informed patient per provider, need appointment before further refills. Patient scheduled 06/05/2019. Advised medication will be refilled at visit. Pt verbalized understanding.

## 2019-06-05 ENCOUNTER — OFFICE VISIT (OUTPATIENT)
Dept: INTERNAL MEDICINE CLINIC | Age: 44
End: 2019-06-05

## 2019-06-05 VITALS
WEIGHT: 249.4 LBS | SYSTOLIC BLOOD PRESSURE: 120 MMHG | BODY MASS INDEX: 44.19 KG/M2 | HEART RATE: 68 BPM | HEIGHT: 63 IN | TEMPERATURE: 98.8 F | OXYGEN SATURATION: 98 % | DIASTOLIC BLOOD PRESSURE: 84 MMHG | RESPIRATION RATE: 16 BRPM

## 2019-06-05 DIAGNOSIS — G43.909 MIGRAINE WITHOUT STATUS MIGRAINOSUS, NOT INTRACTABLE, UNSPECIFIED MIGRAINE TYPE: ICD-10-CM

## 2019-06-05 DIAGNOSIS — E66.01 OBESITY, MORBID, BMI 40.0-49.9 (HCC): ICD-10-CM

## 2019-06-05 DIAGNOSIS — I10 ESSENTIAL HYPERTENSION: Primary | ICD-10-CM

## 2019-06-05 DIAGNOSIS — F41.1 GAD (GENERALIZED ANXIETY DISORDER): ICD-10-CM

## 2019-06-05 DIAGNOSIS — F33.9 RECURRENT DEPRESSION (HCC): ICD-10-CM

## 2019-06-05 RX ORDER — LISINOPRIL 20 MG/1
20 TABLET ORAL DAILY
Qty: 90 TAB | Refills: 1 | Status: SHIPPED | OUTPATIENT
Start: 2019-06-05

## 2019-06-05 RX ORDER — ESCITALOPRAM OXALATE 20 MG/1
TABLET ORAL
Qty: 90 TAB | Refills: 1 | Status: SHIPPED | OUTPATIENT
Start: 2019-06-05

## 2019-06-05 RX ORDER — ELETRIPTAN HYDROBROMIDE 40 MG/1
TABLET, FILM COATED ORAL
Qty: 16 TAB | Refills: 1 | Status: SHIPPED | OUTPATIENT
Start: 2019-06-05

## 2019-06-05 NOTE — PROGRESS NOTES
HISTORY OF PRESENT ILLNESS  Trorie Asif is a 37 y.o. female. HPI  Cardiovascular Review:  The patient has hypertension and obesity Body mass index is 44.18 kg/m². contemplative about weight loss. Plans to start weight loss program after she sells her home. Diet and Lifestyle: exercises sporadically, nonsmoker  Home BP Monitoring: is not measured at home. Pertinent ROS: no TIA's, no chest pain on exertion, no dyspnea on exertion, no swelling of ankles, taking medications as instructed\",no medication side effects noted. Depression and Anxiety   Patient is seen for followup of depression and anxiety. Treatment includes Effexor and no other therapies. she denies suicidal thoughts with specific plan. she experiences the following side effects from the treatment: none. 3 most recent PHQ Screens 7/24/2018   Little interest or pleasure in doing things Not at all   Feeling down, depressed, irritable, or hopeless Not at all   Total Score PHQ 2 0   Trouble falling or staying asleep, or sleeping too much -   Feeling tired or having little energy -   Poor appetite, weight loss, or overeating -   Feeling bad about yourself - or that you are a failure or have let yourself or your family down -   Trouble concentrating on things such as school, work, reading, or watching TV -   Moving or speaking so slowly that other people could have noticed; or the opposite being so fidgety that others notice -   Thoughts of being better off dead, or hurting yourself in some way -   PHQ 9 Score -           SHx: Mother moved in with her. Has reduced her stress. Mother is frail from remote fall.      ROSper HPI   Patient Active Problem List    Diagnosis Date Noted    [facility]    Adopted person 12/11/2017    Iron deficiency anemia due to chronic blood loss 06/23/2016    Vitamin D insufficiency 06/23/2016    Abnormal findings on esophagogastroduodenoscopy (EGD) 06/23/2016    Infected lesion in nose 10/04/2015    Hypercalcemia 10/04/2015    MAGGY (generalized anxiety disorder) 09/30/2015    Hypovitaminosis D 01/26/2015    Morbid obesity due to excess calories ([facility]) 04/28/2014    Mild sleep apnea 04/28/2014    Migraines        Current Outpatient Medications   Medication Sig Dispense Refill    escitalopram oxalate (LEXAPRO) 20 mg tablet TAKE 1 TABLET EVERY DAY (LAST RX TILL SEEN, SCHEDULE AN APPOINTMENT) 90 Tab 1    eletriptan (RELPAX) 40 mg tablet TAKE 1 TAB BY MOUTH DAILY AS NEEDED. MAY REPEAT IN 2 HOURS IF 1ST DOSE NOT EFFECTIVE 8 Tab 1    lisinopril (PRINIVIL, ZESTRIL) 20 mg tablet Take 1 Tab by mouth daily. 90 Tab 1    multivitamin (ONE A DAY) tablet Take 1 Tab by mouth daily.  cholecalciferol, vitamin D3, (VITAMIN D3 PO) Take  by mouth.  albuterol (PROVENTIL HFA, VENTOLIN HFA, PROAIR HFA) 90 mcg/actuation inhaler Use albuterol inhaler 2-4 puffs every 4-6 hours for 48 hours then every 4-6 hours as needed. 1 Inhaler 0       Allergies   Allergen Reactions    Hay Fever And Allergy Relief Itching     Rash, sneezing    Pcn [Penicillins] Rash      Visit Vitals  /84 (BP 1 Location: Left arm, BP Patient Position: Sitting)   Pulse 68   Temp 98.8 °F (37.1 °C) (Oral)   Resp 16   Ht 5' 3\" (1.6 m)   Wt 249 lb 6.4 oz (113.1 kg)   SpO2 98%   BMI 44.18 kg/m²       Physical Exam   Constitutional: She is oriented to person, place, and time. No distress. Cardiovascular: Normal rate and regular rhythm. Pulmonary/Chest: Breath sounds normal. No respiratory distress. She has no wheezes. She has no rales. Musculoskeletal: She exhibits no edema (BLE). Neurological: She is alert and oriented to person, place, and time. Psychiatric: She has a normal mood and affect. ASSESSMENT and PLAN  Diagnoses and all orders for this visit:    1. Essential hypertension- well controlled. Counseled on diet and exercise.  Continue current regimen.   -     LIPID PANEL  -     METABOLIC PANEL, COMPREHENSIVE  - lisinopril (PRINIVIL, ZESTRIL) 20 mg tablet; Take 1 Tab by mouth daily. 2. MAGGY (generalized anxiety disorder)- well controlled. Continue current regimen and optimal self care. Patient Education:  Reviewed concept of anxiety as biochemical imbalance of neurotransmitters and rationale for treatment. Instructed patient to contact office or 911 promptly should condition worsen or any new symptoms appear and provided on-call telephone numbers. IF THE PATIENT HAS ANY SUICIDAL OR HOMICIDAL IDEATION, CALL THE OFFICE, DISCUSS WITH A SUPPORT MEMBER OR GO TO THE ER IMMEDIATELY. Patient was agreeable with this    -     escitalopram oxalate (LEXAPRO) 20 mg tablet; Take 1 tablet by mouth daily    3. Migraine without status migrainosus, not intractable, unspecified migraine type- improved now <2 HA/ month. Continue current regimen. -     eletriptan (RELPAX) 40 mg tablet; TAKE 1 TAB BY MOUTH DAILY AS NEEDED. MAY REPEAT IN 2 HOURS IF 1ST DOSE NOT EFFECTIVE    4. Recurrent depression ([facility]- in remission. Patient Education:  Reviewed concept of depression as biochemical imbalance of neurotransmitters and rationale for treatment. Instructed patient to contact office or 911 promptly should condition worsen or any new symptoms appear and provided on-call telephone numbers. IF THE PATIENT HAS ANY SUICIDAL OR HOMICIDAL IDEATION, CALL THE OFFICE, DISCUSS WITH A SUPPORT MEMBER OR GO TO THE ER IMMEDIATELY. Patient was agreeable with this    5. Obesity, morbid, BMI 40.0-49.9 ([address]- contemplative about weight loss. Plans to see nutritionist when she is ready to start weight loss. -     REFERRAL TO NUTRITION      Follow-up and Dispositions    · Return in about 4 months (around 10/5/2019) for Establish with new primary care. Medication risks/benefits/costs/interactions/alternatives discussed with patient. Lubna Porter  was given an after visit summary which includes diagnoses, current medications, & vitals.   she expressed understanding with the diagnosis and plan.

## 2019-06-05 NOTE — PATIENT INSTRUCTIONS
It was a pleasure to see you! As discussed:    I'm glad you are better. It has been a pleasure caring for you! For your next appointment:   Schedule with Dr. [facility]  [phone]    High Blood Pressure (BP)  Well controlled today  -Continue to check your BP at home   -Follow a low sodium diet (<1500mg/ day)   -Exercise regularly goal, 150 minutes of cardiovascular exercise/ week  -If your blood pressure is too low (<90/60) or too high (>180/100) or you have any symptoms such as chest pain, dizziness, shortness of breath- seek immediate medical attention. See  Www.health.gov for more information. Anxiety Disorder: Care Instructions  Your Care Instructions  Anxiety is a normal reaction to stress. Difficult situations can cause you to have symptoms such as sweaty palms and a nervous feeling. In an anxiety disorder, the symptoms are far more severe. Constant worry, muscle tension, trouble sleeping, nausea and diarrhea, and other symptoms can make normal daily activities difficult or impossible. These symptoms may occur for no reason, and they can affect your work, school, or social life. Medicines, counseling, and self-care can all help. Follow-up care is a key part of your treatment and safety. Be sure to make and go to all appointments, and call your doctor if you are having problems. It's also a good idea to know your test results and keep a list of the medicines you take. How can you care for yourself at home? · Take medicines exactly as directed. Call your doctor if you think you are having a problem with your medicine. · Go to your counseling sessions and follow-up appointments. · Recognize and accept your anxiety. Then, when you are in a situation that makes you anxious, say to yourself, \"This is not an emergency. I feel uncomfortable, but I am not in danger. I can keep going even if I feel anxious. \"  · Be kind to your body:  ? Relieve tension with exercise or a massage. ? Get enough rest.  ? Avoid alcohol, caffeine, nicotine, and illegal drugs. They can increase your anxiety level and cause sleep problems. ? Learn and do relaxation techniques. See below for more about these techniques. · Engage your mind. Get out and do something you enjoy. Go to a funny movie, or take a walk or hike. Plan your day. Having too much or too little to do can make you anxious. · Keep a record of your symptoms. Discuss your fears with a good friend or family member, or join a support group for people with similar problems. Talking to others sometimes relieves stress. · Get involved in social groups, or volunteer to help others. Being alone sometimes makes things seem worse than they are. · Get at least 30 minutes of exercise on most days of the week to relieve stress. Walking is a good choice. You also may want to do other activities, such as running, swimming, cycling, or playing tennis or team sports. Relaxation techniques  Do relaxation exercises 10 to 20 minutes a day. You can play soothing, relaxing music while you do them, if you wish. · Tell others in your house that you are going to do your relaxation exercises. Ask them not to disturb you. · Find a comfortable place, away from all distractions and noise. · Lie down on your back, or sit with your back straight. · Focus on your breathing. Make it slow and steady. · Breathe in through your nose. Breathe out through either your nose or mouth. · Breathe deeply, filling up the area between your navel and your rib cage. Breathe so that your belly goes up and down. · Do not hold your breath. · Breathe like this for 5 to 10 minutes. Notice the feeling of calmness throughout your whole body. As you continue to breathe slowly and deeply, relax by doing the following for another 5 to 10 minutes:  · Tighten and relax each muscle group in your body. You can begin at your toes and work your way up to your head.   · Imagine your muscle groups relaxing and becoming heavy. · Empty your mind of all thoughts. · Let yourself relax more and more deeply. · Become aware of the state of calmness that surrounds you. · When your relaxation time is over, you can bring yourself back to alertness by moving your fingers and toes and then your hands and feet and then stretching and moving your entire body. Sometimes people fall asleep during relaxation, but they usually wake up shortly afterward. · Always give yourself time to return to full alertness before you drive a car or do anything that might cause an accident if you are not fully alert. Never play a relaxation tape while you drive a car. When should you call for help? Call 911 anytime you think you may need emergency care. For example, call if:    · You feel you cannot stop from hurting yourself or someone else.   Denise Murphy the numbers for these national suicide hotlines: 1-044-052-TALK (6-300.605.2395) and 0-805-PRJPHNY (5-107.107.6281). If you or someone you know talks about suicide or feeling hopeless, get help right away.   Watch closely for changes in your health, and be sure to contact your doctor if:    · You have anxiety or fear that affects your life.     · You have symptoms of anxiety that are new or different from those you had before. Where can you learn more? Go to http://rupert-louie.info/. Enter P754 in the search box to learn more about \"Anxiety Disorder: Care Instructions. \"  Current as of: September 11, 2018  Content Version: 11.9  © 0089-5317 [employer]. Care instructions adapted under license by [employer] (which disclaims liability or warranty for this information). If you have questions about a medical condition or this instruction, always ask your healthcare professional. [address] any warranty or liability for your use of this information.

## 2019-06-05 NOTE — PROGRESS NOTES
Identified pt with two pt identifiers(name and ). Reviewed record in preparation for visit and have obtained necessary documentation. All patient medications has been reviewed. Chief Complaint   Patient presents with    Anxiety       There are no preventive care reminders to display for this patient. Vitals:    19 0948   BP: 120/84   Pulse: 68   Resp: 16   Temp: 98.8 °F (37.1 °C)   TempSrc: Oral   SpO2: 98%   Weight: 249 lb 6.4 oz (113.1 kg)   Height: 5' 3\" (1.6 m)   PainSc:   0 - No pain   LMP: 05/15/2019       Coordination of Care Questionnaire:   1) Have you been to an emergency room, urgent care, or hospitalized since your last visit?   no       2. Have seen or consulted any other health care provider since your last visit? NO    3) Do you have an Advanced Directive/ Living Will in place? NO  If yes, do we have a copy on file NO  If no, would you like information NO    Patient is accompanied by self I have received verbal consent from [place] to discuss any/all medical information while they are present in the room.

## 2020-08-28 ENCOUNTER — HOSPITAL ENCOUNTER (OUTPATIENT)
Dept: NUCLEAR MEDICINE | Age: 45
Discharge: HOME | End: 2020-08-28
Attending: INTERNAL MEDICINE
Payer: COMMERCIAL

## 2020-08-28 ENCOUNTER — HOSPITAL ENCOUNTER (OUTPATIENT)
Dept: ULTRASOUND IMAGING | Age: 45
Discharge: HOME | End: 2020-08-28
Attending: INTERNAL MEDICINE
Payer: COMMERCIAL

## 2020-08-28 ENCOUNTER — HOSPITAL ENCOUNTER (OUTPATIENT)
Dept: MAMMOGRAPHY | Age: 45
Discharge: HOME | End: 2020-08-28
Attending: INTERNAL MEDICINE
Payer: COMMERCIAL

## 2020-08-28 DIAGNOSIS — E21.0 PRIMARY HYPERPARATHYROIDISM (HCC): ICD-10-CM

## 2020-08-28 PROCEDURE — 77080 DXA BONE DENSITY AXIAL: CPT

## 2020-08-28 PROCEDURE — 76536 US EXAM OF HEAD AND NECK: CPT

## 2020-08-28 PROCEDURE — 78070 PARATHYROID PLANAR IMAGING: CPT

## 2021-02-18 ENCOUNTER — TRANSCRIBE ORDER (OUTPATIENT)
Dept: SCHEDULING | Age: 46
End: 2021-02-18

## 2021-02-18 DIAGNOSIS — E04.2 NON-TOXIC MULTINODULAR GOITER: Primary | ICD-10-CM

## 2021-08-10 ENCOUNTER — HOSPITAL ENCOUNTER (OUTPATIENT)
Dept: ULTRASOUND IMAGING | Age: 46
Discharge: HOME | End: 2021-08-10
Attending: INTERNAL MEDICINE
Payer: COMMERCIAL

## 2021-08-10 DIAGNOSIS — E04.2 NON-TOXIC MULTINODULAR GOITER: ICD-10-CM

## 2021-08-10 PROCEDURE — 76536 US EXAM OF HEAD AND NECK: CPT

## 2021-08-25 ENCOUNTER — TRANSCRIBE ORDER (OUTPATIENT)
Dept: SCHEDULING | Age: 46
End: 2021-08-25

## 2021-08-25 DIAGNOSIS — E04.2 NONTOXIC MULTINODULAR GOITER: Primary | ICD-10-CM

## 2021-09-15 ENCOUNTER — HOSPITAL ENCOUNTER (OUTPATIENT)
Dept: ULTRASOUND IMAGING | Age: 46
Discharge: HOME | End: 2021-09-15
Attending: INTERNAL MEDICINE
Payer: COMMERCIAL

## 2021-09-15 DIAGNOSIS — E04.2 NONTOXIC MULTINODULAR GOITER: ICD-10-CM

## 2021-09-15 PROCEDURE — 88173 CYTOPATH EVAL FNA REPORT: CPT

## 2021-09-15 PROCEDURE — 10006 FNA BX W/US GDN EA ADDL: CPT

## 2021-09-15 PROCEDURE — 10005 FNA BX W/US GDN 1ST LES: CPT

## 2021-09-15 PROCEDURE — 88172 CYTP DX EVAL FNA 1ST EA SITE: CPT

## 2021-09-15 RX ORDER — LIDOCAINE HYDROCHLORIDE 10 MG/ML
4 INJECTION, SOLUTION EPIDURAL; INFILTRATION; INTRACAUDAL; PERINEURAL
Status: COMPLETED | OUTPATIENT
Start: 2021-09-15 | End: 2021-09-15

## 2021-09-15 RX ADMIN — LIDOCAINE HYDROCHLORIDE 10 ML: 10 INJECTION, SOLUTION EPIDURAL; INFILTRATION; INTRACAUDAL; PERINEURAL at 14:18

## 2021-09-15 NOTE — PROCEDURES
Interventional Radiology  Procedure Note        9/15/2021 2:08 PM    Patient: Omari Doyle     Informed consent obtained    Diagnosis: Right cystic thyroid nodule, mid-pole; left cystic thyroid nodule, mid-pole; left mixed solid/cystic nodule left lower pole    Procedure(s): ultrasound guided needle aspiration of all 3 nodules    Specimens removed:  3x 25ga needle aspiration of each nodule, for a total of 9 passes    Complications: None    Primary Physician: Mariusz Nava MD    Recommendations: N/A    Discharge Disposition: Stable; discharge to home    Full dictated report to follow    Mariusz Nava MD  Interventional Radiology  [facility]  2:08 PM, 9/15/2021

## 2022-03-18 PROBLEM — Z02.82 ADOPTED PERSON: Status: ACTIVE | Noted: 2017-12-11

## 2022-03-19 PROBLEM — F33.9 RECURRENT DEPRESSION (HCC): Status: ACTIVE | Noted: 2017-12-28

## 2022-07-01 ENCOUNTER — TRANSCRIBE ORDER (OUTPATIENT)
Dept: SCHEDULING | Age: 47
End: 2022-07-01

## 2022-07-01 DIAGNOSIS — D25.9 UTERINE FIBROID: Primary | ICD-10-CM

## 2022-07-01 DIAGNOSIS — N93.8 DYSFUNCTIONAL UTERINE BLEEDING: ICD-10-CM

## 2022-07-12 ENCOUNTER — HOSPITAL ENCOUNTER (OUTPATIENT)
Dept: MRI IMAGING | Age: 47
Discharge: HOME | End: 2022-07-12
Attending: RADIOLOGY
Payer: COMMERCIAL

## 2022-07-12 DIAGNOSIS — D25.9 UTERINE FIBROID: ICD-10-CM

## 2022-07-12 DIAGNOSIS — N93.8 DYSFUNCTIONAL UTERINE BLEEDING: ICD-10-CM

## 2022-07-12 PROCEDURE — A9576 INJ PROHANCE MULTIPACK: HCPCS

## 2022-07-12 PROCEDURE — 74011250636 HC RX REV CODE- 250/636

## 2022-07-12 PROCEDURE — 72197 MRI PELVIS W/O & W/DYE: CPT

## 2022-07-12 RX ADMIN — GADOTERIDOL 20 ML: 279.3 INJECTION, SOLUTION INTRAVENOUS at 18:27

## 2023-05-21 RX ORDER — LISINOPRIL 20 MG/1
20 TABLET ORAL DAILY
COMMUNITY
Start: 2019-06-05

## 2023-05-21 RX ORDER — ELETRIPTAN HYDROBROMIDE 40 MG/1
TABLET, FILM COATED ORAL
COMMUNITY
Start: 2019-06-05

## 2023-05-21 RX ORDER — ESCITALOPRAM OXALATE 20 MG/1
1 TABLET ORAL DAILY
COMMUNITY
Start: 2019-06-05

## 2024-08-28 ENCOUNTER — HOSPITAL ENCOUNTER (OUTPATIENT)
Facility: HOSPITAL | Age: 49
Discharge: HOME | End: 2024-08-31
Payer: COMMERCIAL

## 2024-08-28 ENCOUNTER — ANESTHESIA EVENT (OUTPATIENT)
Facility: HOSPITAL | Age: 49
End: 2024-08-28
Payer: COMMERCIAL

## 2024-08-28 VITALS
WEIGHT: 286.4 LBS | OXYGEN SATURATION: 97 % | TEMPERATURE: 98.1 F | DIASTOLIC BLOOD PRESSURE: 79 MMHG | SYSTOLIC BLOOD PRESSURE: 114 MMHG | HEIGHT: 64 IN | HEART RATE: 62 BPM | BODY MASS INDEX: 48.9 KG/M2

## 2024-08-28 LAB
ABO + RH BLD: NORMAL
ALBUMIN SERPL-MCNC: 3.6 G/DL (ref 3.5–5)
ALBUMIN/GLOB SERPL: 1.2 (ref 1.1–2.2)
ALP SERPL-CCNC: 80 U/L (ref 45–117)
ALT SERPL-CCNC: 35 U/L (ref 12–78)
ANION GAP SERPL CALC-SCNC: 3 MMOL/L (ref 5–15)
APPEARANCE UR: CLEAR
AST SERPL-CCNC: 23 U/L (ref 15–37)
BACTERIA URNS QL MICRO: NEGATIVE /HPF
BASOPHILS # BLD: 0 K/UL (ref 0–0.1)
BASOPHILS NFR BLD: 0 % (ref 0–1)
BILIRUB SERPL-MCNC: 0.8 MG/DL (ref 0.2–1)
BILIRUB UR QL: NEGATIVE
BLOOD GROUP ANTIBODIES SERPL: NORMAL
BUN SERPL-MCNC: 17 MG/DL (ref 6–20)
BUN/CREAT SERPL: 16 (ref 12–20)
CALCIUM SERPL-MCNC: 9.6 MG/DL (ref 8.5–10.1)
CHLORIDE SERPL-SCNC: 109 MMOL/L (ref 97–108)
CO2 SERPL-SCNC: 25 MMOL/L (ref 21–32)
COLOR UR: ABNORMAL
CREAT SERPL-MCNC: 1.09 MG/DL (ref 0.55–1.02)
DIFFERENTIAL METHOD BLD: NORMAL
EKG ATRIAL RATE: 63 BPM
EKG DIAGNOSIS: NORMAL
EKG P AXIS: 54 DEGREES
EKG P-R INTERVAL: 154 MS
EKG Q-T INTERVAL: 406 MS
EKG QRS DURATION: 94 MS
EKG QTC CALCULATION (BAZETT): 415 MS
EKG R AXIS: 51 DEGREES
EKG T AXIS: 44 DEGREES
EKG VENTRICULAR RATE: 63 BPM
EOSINOPHIL # BLD: 0.2 K/UL (ref 0–0.4)
EOSINOPHIL NFR BLD: 3 % (ref 0–7)
EPITH CASTS URNS QL MICRO: ABNORMAL /LPF
ERYTHROCYTE [DISTWIDTH] IN BLOOD BY AUTOMATED COUNT: 13 % (ref 11.5–14.5)
GLOBULIN SER CALC-MCNC: 3 G/DL (ref 2–4)
GLUCOSE SERPL-MCNC: 101 MG/DL (ref 65–100)
GLUCOSE UR STRIP.AUTO-MCNC: NEGATIVE MG/DL
HCT VFR BLD AUTO: 41.4 % (ref 35–47)
HGB BLD-MCNC: 13.6 G/DL (ref 11.5–16)
HGB UR QL STRIP: NEGATIVE
HYALINE CASTS URNS QL MICRO: ABNORMAL /LPF (ref 0–5)
IMM GRANULOCYTES # BLD AUTO: 0 K/UL (ref 0–0.04)
IMM GRANULOCYTES NFR BLD AUTO: 0 % (ref 0–0.5)
KETONES UR QL STRIP.AUTO: NEGATIVE MG/DL
LEUKOCYTE ESTERASE UR QL STRIP.AUTO: NEGATIVE
LYMPHOCYTES # BLD: 1.8 K/UL (ref 0.8–3.5)
LYMPHOCYTES NFR BLD: 23 % (ref 12–49)
MCH RBC QN AUTO: 28.8 PG (ref 26–34)
MCHC RBC AUTO-ENTMCNC: 32.9 G/DL (ref 30–36.5)
MCV RBC AUTO: 87.5 FL (ref 80–99)
MONOCYTES # BLD: 0.6 K/UL (ref 0–1)
MONOCYTES NFR BLD: 8 % (ref 5–13)
NEUTS SEG # BLD: 5.2 K/UL (ref 1.8–8)
NEUTS SEG NFR BLD: 66 % (ref 32–75)
NITRITE UR QL STRIP.AUTO: POSITIVE
NRBC # BLD: 0 K/UL (ref 0–0.01)
NRBC BLD-RTO: 0 PER 100 WBC
PH UR STRIP: 5.5 (ref 5–8)
PLATELET # BLD AUTO: 205 K/UL (ref 150–400)
PMV BLD AUTO: 11.7 FL (ref 8.9–12.9)
POTASSIUM SERPL-SCNC: 4.3 MMOL/L (ref 3.5–5.1)
PROT SERPL-MCNC: 6.6 G/DL (ref 6.4–8.2)
PROT UR STRIP-MCNC: NEGATIVE MG/DL
RBC # BLD AUTO: 4.73 M/UL (ref 3.8–5.2)
RBC #/AREA URNS HPF: ABNORMAL /HPF (ref 0–5)
SODIUM SERPL-SCNC: 137 MMOL/L (ref 136–145)
SP GR UR REFRACTOMETRY: 1.01 (ref 1–1.03)
SPECIMEN EXP DATE BLD: NORMAL
URINE CULTURE IF INDICATED: ABNORMAL
UROBILINOGEN UR QL STRIP.AUTO: 1 EU/DL (ref 0.2–1)
WBC # BLD AUTO: 7.9 K/UL (ref 3.6–11)
WBC URNS QL MICRO: ABNORMAL /HPF (ref 0–4)

## 2024-08-28 PROCEDURE — 36415 COLL VENOUS BLD VENIPUNCTURE: CPT

## 2024-08-28 PROCEDURE — 86850 RBC ANTIBODY SCREEN: CPT

## 2024-08-28 PROCEDURE — 93005 ELECTROCARDIOGRAM TRACING: CPT | Performed by: UROLOGY

## 2024-08-28 PROCEDURE — 85025 COMPLETE CBC W/AUTO DIFF WBC: CPT

## 2024-08-28 PROCEDURE — 93010 ELECTROCARDIOGRAM REPORT: CPT | Performed by: SPECIALIST

## 2024-08-28 PROCEDURE — 86900 BLOOD TYPING SEROLOGIC ABO: CPT

## 2024-08-28 PROCEDURE — 86901 BLOOD TYPING SEROLOGIC RH(D): CPT

## 2024-08-28 PROCEDURE — 81001 URINALYSIS AUTO W/SCOPE: CPT

## 2024-08-28 PROCEDURE — 71046 X-RAY EXAM CHEST 2 VIEWS: CPT

## 2024-08-28 PROCEDURE — 80053 COMPREHEN METABOLIC PANEL: CPT

## 2024-08-28 RX ORDER — OXYCODONE HCL 10 MG/1
5 TABLET, FILM COATED, EXTENDED RELEASE ORAL AS NEEDED
COMMUNITY

## 2024-08-28 RX ORDER — SPIRONOLACTONE 100 MG/1
100 TABLET, FILM COATED ORAL EVERY EVENING
COMMUNITY

## 2024-08-28 ASSESSMENT — PAIN DESCRIPTION - LOCATION: LOCATION: BACK;ABDOMEN;PELVIS

## 2024-08-28 ASSESSMENT — PAIN DESCRIPTION - ORIENTATION: ORIENTATION: LOWER

## 2024-08-28 ASSESSMENT — PAIN DESCRIPTION - DESCRIPTORS: DESCRIPTORS: DULL;ACHING;SHARP;STABBING

## 2024-08-28 ASSESSMENT — PAIN SCALES - GENERAL: PAINLEVEL_OUTOF10: 3

## 2024-08-28 NOTE — PERIOP NOTE
[address] [address]   MAIN OR                                  [phone]    MAIN PRE OP             [phone]                                                                                AMBULATORY PRE OP          [phone]  PRE-ADMISSION TESTING    [phone]     Surgery Date:  8/29/24       Is surgery arrival time given by surgeon?  NO    If “NO”, [facility] staff will call you between 4 and 7pm the day before your surgery with your arrival time. (If your surgery is on a Monday, we will call you the Friday before.)    Call [phone] after 7pm Monday-Friday if you did not receive this call.    INSTRUCTIONS BEFORE YOUR SURGERY   When You  Arrive Arrive at [facility] Patient Access on 1st floor the day of your surgery.   Medications to   TAKE   Morning of Surgery MEDICATIONS TO TAKE THE MORNING OF SURGERY WITH A SIP OF WATER: KEFLEX    You may take these medications, IF NEEDED, the morning of surgery: OXYCONTIN (UP TO 4 HOURS PRIOR TO YOUR ARRIVAL TIME)  Ask your surgeon/prescribing doctor for instructions on taking or stopping these medications prior to surgery: NONE   Medications to STOP  before surgery Non-Steroidal anti-inflammatory Drugs (NSAID's): for example, Diclofenac (Voltaren), Ibuprofen (Advil, Motrin), Naproxen (Aleve) 3 days  STOP all herbal supplements and vitamins(unless prescribed by your doctor), and fish oil for 7 days  Other: NONE  (Pain medications not listed above, including Tylenol may be taken up until 4 hours prior to arrival time)   Blood  Thinners If you take Aspirin, Plavix, Coumadin, or any blood-thinning or anti-blood clot medicine, talk to the doctor who prescribed the medications for pre-operative instructions.  If you take aspirin or aspirin containing products for pain, stop 7 days prior to surgery   Going Home - or Spending the Night OUTPATIENT SURGERY: You must have a responsible adult drive you

## 2024-08-29 ENCOUNTER — ANESTHESIA (OUTPATIENT)
Facility: HOSPITAL | Age: 49
End: 2024-08-29
Payer: COMMERCIAL

## 2024-08-29 ENCOUNTER — HOSPITAL ENCOUNTER (OUTPATIENT)
Facility: HOSPITAL | Age: 49
Setting detail: OUTPATIENT SURGERY
Discharge: HOME | End: 2024-08-29
Attending: UROLOGY | Admitting: UROLOGY
Payer: COMMERCIAL

## 2024-08-29 ENCOUNTER — APPOINTMENT (OUTPATIENT)
Facility: HOSPITAL | Age: 49
End: 2024-08-29
Attending: UROLOGY
Payer: COMMERCIAL

## 2024-08-29 VITALS
HEART RATE: 74 BPM | TEMPERATURE: 97.7 F | SYSTOLIC BLOOD PRESSURE: 125 MMHG | RESPIRATION RATE: 12 BRPM | DIASTOLIC BLOOD PRESSURE: 77 MMHG | OXYGEN SATURATION: 97 %

## 2024-08-29 PROCEDURE — C1713 ANCHOR/SCREW BN/BN,TIS/BN: HCPCS | Performed by: UROLOGY

## 2024-08-29 PROCEDURE — 74420 UROGRAPHY RTRGR +-KUB: CPT

## 2024-08-29 PROCEDURE — 7100000000 HC PACU RECOVERY - FIRST 15 MIN: Performed by: UROLOGY

## 2024-08-29 PROCEDURE — 2709999900 HC NON-CHARGEABLE SUPPLY: Performed by: UROLOGY

## 2024-08-29 PROCEDURE — 3700000000 HC ANESTHESIA ATTENDED CARE: Performed by: UROLOGY

## 2024-08-29 PROCEDURE — 6370000000 HC RX 637 (ALT 250 FOR IP): Performed by: STUDENT IN AN ORGANIZED HEALTH CARE EDUCATION/TRAINING PROGRAM

## 2024-08-29 PROCEDURE — 6360000002 HC RX W HCPCS: Performed by: NURSE PRACTITIONER

## 2024-08-29 PROCEDURE — 2580000003 HC RX 258: Performed by: NURSE PRACTITIONER

## 2024-08-29 PROCEDURE — 3700000001 HC ADD 15 MINUTES (ANESTHESIA): Performed by: UROLOGY

## 2024-08-29 PROCEDURE — 7100000010 HC PHASE II RECOVERY - FIRST 15 MIN: Performed by: UROLOGY

## 2024-08-29 PROCEDURE — 3600000014 HC SURGERY LEVEL 4 ADDTL 15MIN: Performed by: UROLOGY

## 2024-08-29 PROCEDURE — 7100000011 HC PHASE II RECOVERY - ADDTL 15 MIN: Performed by: UROLOGY

## 2024-08-29 PROCEDURE — C1769 GUIDE WIRE: HCPCS | Performed by: UROLOGY

## 2024-08-29 PROCEDURE — 2720000010 HC SURG SUPPLY STERILE: Performed by: UROLOGY

## 2024-08-29 PROCEDURE — 7100000001 HC PACU RECOVERY - ADDTL 15 MIN: Performed by: UROLOGY

## 2024-08-29 PROCEDURE — 2500000003 HC RX 250 WO HCPCS: Performed by: NURSE PRACTITIONER

## 2024-08-29 PROCEDURE — 2580000003 HC RX 258: Performed by: STUDENT IN AN ORGANIZED HEALTH CARE EDUCATION/TRAINING PROGRAM

## 2024-08-29 PROCEDURE — 3600000004 HC SURGERY LEVEL 4 BASE: Performed by: UROLOGY

## 2024-08-29 RX ORDER — SODIUM CHLORIDE 9 MG/ML
INJECTION, SOLUTION INTRAVENOUS PRN
Status: DISCONTINUED | OUTPATIENT
Start: 2024-08-29 | End: 2024-08-29 | Stop reason: HOSPADM

## 2024-08-29 RX ORDER — SODIUM CHLORIDE 0.9 % (FLUSH) 0.9 %
5-40 SYRINGE (ML) INJECTION PRN
Status: DISCONTINUED | OUTPATIENT
Start: 2024-08-29 | End: 2024-08-29 | Stop reason: HOSPADM

## 2024-08-29 RX ORDER — HYDRALAZINE HYDROCHLORIDE 20 MG/ML
10 INJECTION INTRAMUSCULAR; INTRAVENOUS ONCE
Status: DISCONTINUED | OUTPATIENT
Start: 2024-08-29 | End: 2024-08-29 | Stop reason: HOSPADM

## 2024-08-29 RX ORDER — KETOROLAC TROMETHAMINE 10 MG/1
10 TABLET, FILM COATED ORAL EVERY 6 HOURS PRN
Qty: 20 TABLET | Refills: 0 | Status: SHIPPED | OUTPATIENT
Start: 2024-08-29 | End: 2025-08-29

## 2024-08-29 RX ORDER — EPHEDRINE SULFATE 50 MG/ML
INJECTION INTRAVENOUS PRN
Status: DISCONTINUED | OUTPATIENT
Start: 2024-08-29 | End: 2024-08-29 | Stop reason: SDUPTHER

## 2024-08-29 RX ORDER — CEPHALEXIN 500 MG/1
500 CAPSULE ORAL 2 TIMES DAILY
Qty: 14 CAPSULE | Refills: 0 | Status: SHIPPED | OUTPATIENT
Start: 2024-08-29 | End: 2024-09-05

## 2024-08-29 RX ORDER — DEXAMETHASONE SODIUM PHOSPHATE 4 MG/ML
INJECTION, SOLUTION INTRA-ARTICULAR; INTRALESIONAL; INTRAMUSCULAR; INTRAVENOUS; SOFT TISSUE PRN
Status: DISCONTINUED | OUTPATIENT
Start: 2024-08-29 | End: 2024-08-29 | Stop reason: SDUPTHER

## 2024-08-29 RX ORDER — SODIUM CHLORIDE, SODIUM LACTATE, POTASSIUM CHLORIDE, CALCIUM CHLORIDE 600; 310; 30; 20 MG/100ML; MG/100ML; MG/100ML; MG/100ML
INJECTION, SOLUTION INTRAVENOUS CONTINUOUS
Status: DISCONTINUED | OUTPATIENT
Start: 2024-08-29 | End: 2024-08-29 | Stop reason: HOSPADM

## 2024-08-29 RX ORDER — LIDOCAINE HYDROCHLORIDE 10 MG/ML
1 INJECTION, SOLUTION EPIDURAL; INFILTRATION; INTRACAUDAL; PERINEURAL
Status: DISCONTINUED | OUTPATIENT
Start: 2024-08-29 | End: 2024-08-29 | Stop reason: HOSPADM

## 2024-08-29 RX ORDER — HYDROMORPHONE HYDROCHLORIDE 1 MG/ML
0.5 INJECTION, SOLUTION INTRAMUSCULAR; INTRAVENOUS; SUBCUTANEOUS EVERY 5 MIN PRN
Status: DISCONTINUED | OUTPATIENT
Start: 2024-08-29 | End: 2024-08-29 | Stop reason: HOSPADM

## 2024-08-29 RX ORDER — ACETAMINOPHEN 500 MG
1000 TABLET ORAL ONCE
Status: COMPLETED | OUTPATIENT
Start: 2024-08-29 | End: 2024-08-29

## 2024-08-29 RX ORDER — FENTANYL CITRATE 50 UG/ML
INJECTION, SOLUTION INTRAMUSCULAR; INTRAVENOUS PRN
Status: DISCONTINUED | OUTPATIENT
Start: 2024-08-29 | End: 2024-08-29 | Stop reason: SDUPTHER

## 2024-08-29 RX ORDER — FENTANYL CITRATE 50 UG/ML
100 INJECTION, SOLUTION INTRAMUSCULAR; INTRAVENOUS
Status: DISCONTINUED | OUTPATIENT
Start: 2024-08-29 | End: 2024-08-29 | Stop reason: HOSPADM

## 2024-08-29 RX ORDER — LIDOCAINE HYDROCHLORIDE 20 MG/ML
INJECTION, SOLUTION EPIDURAL; INFILTRATION; INTRACAUDAL; PERINEURAL PRN
Status: DISCONTINUED | OUTPATIENT
Start: 2024-08-29 | End: 2024-08-29 | Stop reason: SDUPTHER

## 2024-08-29 RX ORDER — MIDAZOLAM HYDROCHLORIDE 1 MG/ML
INJECTION INTRAMUSCULAR; INTRAVENOUS PRN
Status: DISCONTINUED | OUTPATIENT
Start: 2024-08-29 | End: 2024-08-29 | Stop reason: SDUPTHER

## 2024-08-29 RX ORDER — SUCCINYLCHOLINE/SOD CL,ISO/PF 200MG/10ML
SYRINGE (ML) INTRAVENOUS PRN
Status: DISCONTINUED | OUTPATIENT
Start: 2024-08-29 | End: 2024-08-29 | Stop reason: SDUPTHER

## 2024-08-29 RX ORDER — NALOXONE HYDROCHLORIDE 0.4 MG/ML
INJECTION, SOLUTION INTRAMUSCULAR; INTRAVENOUS; SUBCUTANEOUS PRN
Status: DISCONTINUED | OUTPATIENT
Start: 2024-08-29 | End: 2024-08-29 | Stop reason: HOSPADM

## 2024-08-29 RX ORDER — SODIUM CHLORIDE 0.9 % (FLUSH) 0.9 %
5-40 SYRINGE (ML) INJECTION EVERY 12 HOURS SCHEDULED
Status: DISCONTINUED | OUTPATIENT
Start: 2024-08-29 | End: 2024-08-29 | Stop reason: HOSPADM

## 2024-08-29 RX ORDER — ONDANSETRON 2 MG/ML
INJECTION INTRAMUSCULAR; INTRAVENOUS PRN
Status: DISCONTINUED | OUTPATIENT
Start: 2024-08-29 | End: 2024-08-29 | Stop reason: SDUPTHER

## 2024-08-29 RX ORDER — KETOROLAC TROMETHAMINE 30 MG/ML
INJECTION, SOLUTION INTRAMUSCULAR; INTRAVENOUS PRN
Status: DISCONTINUED | OUTPATIENT
Start: 2024-08-29 | End: 2024-08-29 | Stop reason: SDUPTHER

## 2024-08-29 RX ORDER — PROCHLORPERAZINE EDISYLATE 5 MG/ML
5 INJECTION INTRAMUSCULAR; INTRAVENOUS
Status: DISCONTINUED | OUTPATIENT
Start: 2024-08-29 | End: 2024-08-29 | Stop reason: HOSPADM

## 2024-08-29 RX ORDER — MIDAZOLAM HYDROCHLORIDE 2 MG/2ML
2 INJECTION, SOLUTION INTRAMUSCULAR; INTRAVENOUS
Status: DISCONTINUED | OUTPATIENT
Start: 2024-08-29 | End: 2024-08-29 | Stop reason: HOSPADM

## 2024-08-29 RX ORDER — CEFAZOLIN SODIUM 1 G/3ML
INJECTION, POWDER, FOR SOLUTION INTRAMUSCULAR; INTRAVENOUS PRN
Status: DISCONTINUED | OUTPATIENT
Start: 2024-08-29 | End: 2024-08-29 | Stop reason: SDUPTHER

## 2024-08-29 RX ORDER — OXYCODONE HYDROCHLORIDE 5 MG/1
5 TABLET ORAL
Status: DISCONTINUED | OUTPATIENT
Start: 2024-08-29 | End: 2024-08-29 | Stop reason: HOSPADM

## 2024-08-29 RX ORDER — FENTANYL CITRATE 50 UG/ML
25 INJECTION, SOLUTION INTRAMUSCULAR; INTRAVENOUS EVERY 5 MIN PRN
Status: DISCONTINUED | OUTPATIENT
Start: 2024-08-29 | End: 2024-08-29 | Stop reason: HOSPADM

## 2024-08-29 RX ORDER — ONDANSETRON 2 MG/ML
4 INJECTION INTRAMUSCULAR; INTRAVENOUS
Status: DISCONTINUED | OUTPATIENT
Start: 2024-08-29 | End: 2024-08-29 | Stop reason: HOSPADM

## 2024-08-29 RX ORDER — SODIUM CHLORIDE, SODIUM LACTATE, POTASSIUM CHLORIDE, CALCIUM CHLORIDE 600; 310; 30; 20 MG/100ML; MG/100ML; MG/100ML; MG/100ML
INJECTION, SOLUTION INTRAVENOUS CONTINUOUS PRN
Status: DISCONTINUED | OUTPATIENT
Start: 2024-08-29 | End: 2024-08-29 | Stop reason: SDUPTHER

## 2024-08-29 RX ORDER — ROCURONIUM BROMIDE 10 MG/ML
INJECTION, SOLUTION INTRAVENOUS PRN
Status: DISCONTINUED | OUTPATIENT
Start: 2024-08-29 | End: 2024-08-29 | Stop reason: SDUPTHER

## 2024-08-29 RX ADMIN — ONDANSETRON 4 MG: 2 INJECTION INTRAMUSCULAR; INTRAVENOUS at 14:18

## 2024-08-29 RX ADMIN — ROCURONIUM BROMIDE 10 MG: 10 INJECTION, SOLUTION INTRAVENOUS at 13:34

## 2024-08-29 RX ADMIN — MIDAZOLAM HYDROCHLORIDE 3 MG: 1 INJECTION, SOLUTION INTRAMUSCULAR; INTRAVENOUS at 13:25

## 2024-08-29 RX ADMIN — FENTANYL CITRATE 50 MCG: 50 INJECTION, SOLUTION INTRAMUSCULAR; INTRAVENOUS at 13:34

## 2024-08-29 RX ADMIN — ONDANSETRON 4 MG: 2 INJECTION INTRAMUSCULAR; INTRAVENOUS at 13:40

## 2024-08-29 RX ADMIN — FENTANYL CITRATE 50 MCG: 50 INJECTION, SOLUTION INTRAMUSCULAR; INTRAVENOUS at 13:42

## 2024-08-29 RX ADMIN — ACETAMINOPHEN 1000 MG: 500 TABLET ORAL at 12:17

## 2024-08-29 RX ADMIN — PROPOFOL 150 MG: 10 INJECTION, EMULSION INTRAVENOUS at 13:34

## 2024-08-29 RX ADMIN — DEXAMETHASONE SODIUM PHOSPHATE 4 MG: 4 INJECTION, SOLUTION INTRAMUSCULAR; INTRAVENOUS at 13:40

## 2024-08-29 RX ADMIN — SODIUM CHLORIDE, POTASSIUM CHLORIDE, SODIUM LACTATE AND CALCIUM CHLORIDE: 600; 310; 30; 20 INJECTION, SOLUTION INTRAVENOUS at 12:32

## 2024-08-29 RX ADMIN — Medication 200 MG: at 13:34

## 2024-08-29 RX ADMIN — CEFAZOLIN 2 G: 330 INJECTION, POWDER, FOR SOLUTION INTRAMUSCULAR; INTRAVENOUS at 13:40

## 2024-08-29 RX ADMIN — SODIUM CHLORIDE, POTASSIUM CHLORIDE, SODIUM LACTATE AND CALCIUM CHLORIDE: 600; 310; 30; 20 INJECTION, SOLUTION INTRAVENOUS at 13:29

## 2024-08-29 RX ADMIN — EPHEDRINE SULFATE 10 MG: 50 INJECTION INTRAVENOUS at 13:48

## 2024-08-29 RX ADMIN — MIDAZOLAM HYDROCHLORIDE 2 MG: 1 INJECTION, SOLUTION INTRAMUSCULAR; INTRAVENOUS at 13:29

## 2024-08-29 RX ADMIN — KETOROLAC TROMETHAMINE 30 MG: 30 INJECTION, SOLUTION INTRAMUSCULAR at 14:06

## 2024-08-29 RX ADMIN — LIDOCAINE HYDROCHLORIDE 80 MG: 20 INJECTION, SOLUTION EPIDURAL; INFILTRATION; INTRACAUDAL; PERINEURAL at 13:34

## 2024-08-29 RX ADMIN — EPHEDRINE SULFATE 10 MG: 50 INJECTION INTRAVENOUS at 13:52

## 2024-08-29 ASSESSMENT — PAIN SCALES - GENERAL
PAINLEVEL_OUTOF10: 0
PAINLEVEL_OUTOF10: 1

## 2024-08-29 ASSESSMENT — PAIN DESCRIPTION - DESCRIPTORS: DESCRIPTORS: DULL;BURNING

## 2024-08-29 ASSESSMENT — PAIN DESCRIPTION - ORIENTATION: ORIENTATION: LOWER

## 2024-08-29 ASSESSMENT — PAIN DESCRIPTION - LOCATION: LOCATION: PELVIS

## 2024-08-29 NOTE — DISCHARGE INSTRUCTIONS
______________________________________________________________________    Anesthesia Discharge Instructions    After general anesthesia or intervenous sedation, for 24 hours or while taking prescription Narcotics:  Limit your activities  Do not drive or operate hazardous machinery  If you have not urinated within 8 hours after discharge, please contact your surgeon on call.  Do not make important personal or business decisions  Do not drink alcoholic beverages    Report the following to your surgeon:  Excessive pain, swelling, redness or odor of or around the surgical area  Temperature over 100.5 degrees  Nausea and vomiting lasting longer than 4 hours or if unable to take medication  Any signs of decreased circulation or nerve impairment to extremity:  Change in color, persistent numbness, tingling, coldness or increased pain.  Any questions    ***You received 1 gm of tylenol at 12:17 pm. You may take tylenol again at 6:17 pm if needed for pain  ***You received 30 mg of ketorolac at 2:06 pm. You may take

## 2024-08-29 NOTE — PROGRESS NOTES
Discharge instructions reviewed with patient and family using teachback. All questions have been answered. Prescriptions sent to [facility] pharmacy. Vital signs stable, pain appropriately managed. Patient wheeled off the unit with PACU staff.

## 2024-08-29 NOTE — ANESTHESIA PRE PROCEDURE
Department of Anesthesiology  Preprocedure Note       Name:  Lexy Rae   Age:  48 y.o.  :  1975                                          MRN:  [id number]         Date:  2024      Surgeon: Surgeon(s):  Benjamin Judge MD    Procedure: Procedure(s):  CYSTOSCOPY RETROGRDARE URETEROSCOPY LASER OF STONE AND STENT PLACEMENT    Medications prior to admission:   Prior to Admission medications    Medication Sig Start Date End Date Taking? Authorizing Provider   spironolactone (ALDACTONE) 100 MG tablet Take 1 tablet by mouth every evening   Yes Provider, MD Aravind   NONFORMULARY Topical acne cream   Yes Provider, MD Aravind   Cephalexin (KEFLEX PO) Take by mouth 2 times daily   Yes Provider, MD Aravind   Phenazopyridine HCl (AZO TABS PO) Take by mouth   Yes ProviderAravind MD   oxyCODONE (OXYCONTIN) 10 MG extended release tablet Take 5 mg by mouth as needed for Pain.   Yes ProviderAravind MD   eletriptan (RELPAX) 40 MG tablet TAKE 1 TAB BY MOUTH DAILY AS NEEDED. MAY REPEAT IN 2 HOURS IF 1ST DOSE NOT EFFECTIVE 19  Yes Automatic Reconciliation, Ar   escitalopram (LEXAPRO) 20 MG tablet Take 0.5 tablets by mouth every evening 19  Yes Automatic Reconciliation, Ar   lisinopril (PRINIVIL;ZESTRIL) 20 MG tablet Take 1 tablet by mouth every evening 19  Yes Automatic Reconciliation, Ar       Current medications:    Current Facility-Administered Medications   Medication Dose Route Frequency Provider Last Rate Last Admin   • ceFAZolin (ANCEF) 2,000 mg in sterile water 20 mL IV syringe  2,000 mg IntraVENous On Call to OR Benjamin Judge MD       • lidocaine PF 1 % injection 1 mL  1 mL IntraDERmal Once PRN Roxana Olivera DO       • fentaNYL (SUBLIMAZE) injection 100 mcg  100 mcg IntraVENous Once PRN Roxana Olivera DO       • lactated ringers IV soln infusion   IntraVENous Continuous Roxana Olivera  mL/hr at 24 1232 New Bag at 24 1232   • sodium

## 2024-08-29 NOTE — OP NOTE
Operative Note      Patient: Lexy Rae  YOB: 1975  MRN: [id number]    Date of Procedure: 8/29/2024    Pre-Op Diagnosis Codes:      * Kidney stone [N20.0]    Post-Op Diagnosis: Same       Procedure(s):  CYSTOSCOPY LEFT URETEROSCOPY LASER OF URETERAL STONE AND RENOSCOPY WITH LASER OF RENAL STONE AND STENT PLACEMENT    Surgeon(s):  Benjamin Judge MD    Assistant:   * No surgical staff found *    Anesthesia: General    Estimated Blood Loss (mL): Minimal    Complications: None    Specimens:   * No specimens in log *    Implants:  * No implants in log *      Drains: * No LDAs found *    Findings:  Infection Present At Time Of Surgery (PATOS) (choose all levels that have infection present):  No infection present  Other Findings: According to the brief note    Detailed Description of Procedure:   Patient brought to the operating placed in supine position.  General anesthesia given.  Patient placed in a dorsolithotomy position prepped and draped.  Laterality included in timeout as well as patient and procedure.  21 Monegasque rigid cystoscope and urethra demonstrated normal.  Bladder demonstrated normal dose including visualization of trigone posterior wall dome bladder walls and bladder neck.  The left ureteral orifice was targeted and a sensor wire was placed.  Semirigid ureteroscope was then assembled and passed into the left passage up into the left ureter demonstrating an impacted stone in the left distal ureter consistent with imaging.  240 µm laser fiber was then utilized with dusting settings to complete treatment.  Stone treatment was excellent.  Being cleared the entire ureter.  Stone was notably impacted with significant edema and chronic change.  The more proximal ureter with within normal limits.  Visualized up to the UPJ.  I then used a flexible nephroscope and I was able to pass that of the left kidney at which point we noted a stone in the interpolar region.  The same 240 µm laser fiber

## 2024-08-29 NOTE — ANESTHESIA POSTPROCEDURE EVALUATION
Department of Anesthesiology  Postprocedure Note    Patient: Lexy Rae  MRN: [id number]  YOB: 1975  Date of evaluation: 8/29/2024    Procedure Summary       Date: 08/29/24 Room / Location: [facility] MAIN OR [address] [place] [address] MAIN OR    Anesthesia Start: 1329 Anesthesia Stop: 1422    Procedure: CYSTOSCOPY LEFT URETEROSCOPY LASER OF URETERAL STONE AND RENOSCOPY WITH LASER OF RENAL STONE AND STENT PLACEMENT (Urethra) Diagnosis:       Kidney stone      (Kidney stone [N20.0])    Providers: Benjamin Judge MD Responsible Provider: Alirio Haq Jr., MD    Anesthesia Type: General ASA Status: 3            Anesthesia Type: General    Isaías Phase I: Isaías Score: 10    Isaías Phase II:      Anesthesia Post Evaluation    Patient location during evaluation: PACU  Patient participation: complete - patient participated  Level of consciousness: awake  Airway patency: patent  Nausea & Vomiting: no nausea  Cardiovascular status: blood pressure returned to baseline and hemodynamically stable  Respiratory status: acceptable  Hydration status: stable    No notable events documented.

## 2024-08-29 NOTE — H&P
HISTORY AND PHYSICAL        Assessment:  48 y.o. year old female  Active Problems:    * No active hospital problems. *  Resolved Problems:    * No resolved hospital problems. *      _____________________________________________________________________________________  ASSESSMENT/PLAN:  Left symptomatic stone-patient agrees to go forward with ureteroscopy.  The patient will undergo ureteroscopy. I discussed all risks and benefits associated with ureteroscopy including infection/sepsis, bleeding, ureteral stricture/injury, inability to access proximal to the stones/tumor/blockage->PCN, secondary procedure, stent-related pain, steinstrausse.      _____________________________________________________________________________________    Primary care MD: Tiffany Novak, APRN - NP  - [phone]  Code state: No Order    History of Present Illness:   48 y.o. year old female     Left symptomatic stone with hydronephrosis.  Counseled by Dr. Robert Edmond previously.  Reiterated risks and benefits.    Past Medical History:   Diagnosis Date    Anxiety     Apnea     mild    Arthritis     Depression     Hypertension     Kidney stone     Migraines       Past Surgical History:   Procedure Laterality Date    PARATHYROIDECTOMY  2020    UTERINE FIBROID EMBOLIZATION  2022      Family History   Adopted: Yes   Problem Relation Age of Onset    No Known Problems Father     No Known Problems Mother       Social History     Tobacco Use    Smoking status: Never    Smokeless tobacco: Never   Substance Use Topics    Alcohol use: Not Currently     Alcohol/week: 1.0 standard drink of alcohol     Allergies   Allergen Reactions    Dilaudid [Hydromorphone] Other (See Comments)     hypotension    Penicillins Rash      Prior to Admission medications    Medication Sig Start Date End Date Taking? Authorizing Provider   spironolactone (ALDACTONE) 100 MG tablet Take 1 tablet by mouth every evening   Yes Provider, MD Aravind   NONFORMULARY Topical

## 2024-08-29 NOTE — BRIEF OP NOTE
Brief Postoperative Note      Patient: Lexy Rae  YOB: 1975  MRN: [id number]    Date of Procedure: 8/29/2024    Pre-Op Diagnosis Codes:      * Kidney stone [N20.0]    Post-Op Diagnosis: Same       Procedure(s):  CYSTOSCOPY LEFT URETEROSCOPY LASER OF URETERAL STONE AND RENOSCOPY WITH LASER OF RENAL STONE AND STENT PLACEMENT    Surgeon(s):  Benjamin Judge MD    Assistant:  * No surgical staff found *    Anesthesia: General    Estimated Blood Loss (mL): Minimal    Complications: None    Specimens:   * No specimens in log *    Implants:  * No implants in log *      Drains: * No LDAs found *    Findings:  Infection Present At Time Of Surgery (PATOS) (choose all levels that have infection present):  No infection present  Other Findings: Left ureteral stone and renal stone treatment.  Edema of the left distal ureter at the site of impacted stone.    Electronically signed by Benjamin Judge MD on 8/29/2024 at 2:16 PM

## (undated) DEVICE — 3M™ RANGER™ FLUID WARMING IRRIGATION SET, 24750, 10/CASE: Brand: 3M™ RANGER™

## (undated) DEVICE — FIBER LSR FLEXIVA PULSE 242

## (undated) DEVICE — Device

## (undated) DEVICE — SOLUTION IRRIG 1000ML STRL H2O USP PLAS POUR BTL

## (undated) DEVICE — GUIDEWIRE ENDOSCP L150CM DIA0.035IN TIP 3CM PTFE NIT

## (undated) DEVICE — SYRINGE MED 10ML LUERLOCK TIP W/O SFTY DISP

## (undated) DEVICE — GARMENT,MEDLINE,DVT,INT,CALF,MED, GEN2: Brand: MEDLINE

## (undated) DEVICE — GLOVE ORANGE PI 7 1/2   MSG9075

## (undated) DEVICE — CYSTO-SMH: Brand: MEDLINE INDUSTRIES, INC.

## (undated) DEVICE — SOLUTION IRRIG 3000ML 0.9% SOD CHL USP UROMATIC PLAS CONT